# Patient Record
Sex: FEMALE | Race: WHITE | NOT HISPANIC OR LATINO | Employment: FULL TIME | URBAN - METROPOLITAN AREA
[De-identification: names, ages, dates, MRNs, and addresses within clinical notes are randomized per-mention and may not be internally consistent; named-entity substitution may affect disease eponyms.]

---

## 2017-03-23 ENCOUNTER — OFFICE VISIT (OUTPATIENT)
Dept: MIDWIFE SERVICES | Facility: CLINIC | Age: 27
End: 2017-03-23
Payer: COMMERCIAL

## 2017-03-23 VITALS
HEART RATE: 92 BPM | BODY MASS INDEX: 19.46 KG/M2 | DIASTOLIC BLOOD PRESSURE: 83 MMHG | HEIGHT: 68 IN | WEIGHT: 128.4 LBS | TEMPERATURE: 97 F | SYSTOLIC BLOOD PRESSURE: 123 MMHG

## 2017-03-23 DIAGNOSIS — Z11.3 SCREENING EXAMINATION FOR VENEREAL DISEASE: Primary | ICD-10-CM

## 2017-03-23 DIAGNOSIS — Z97.5 IUD (INTRAUTERINE DEVICE) IN PLACE: ICD-10-CM

## 2017-03-23 DIAGNOSIS — Z01.419 ENCOUNTER FOR WELL WOMAN EXAM: ICD-10-CM

## 2017-03-23 PROCEDURE — 87491 CHLMYD TRACH DNA AMP PROBE: CPT | Performed by: ADVANCED PRACTICE MIDWIFE

## 2017-03-23 PROCEDURE — 99395 PREV VISIT EST AGE 18-39: CPT | Performed by: ADVANCED PRACTICE MIDWIFE

## 2017-03-23 PROCEDURE — 87591 N.GONORRHOEAE DNA AMP PROB: CPT | Performed by: ADVANCED PRACTICE MIDWIFE

## 2017-03-23 NOTE — PROGRESS NOTES
America is a 27 year old  female who presents for annual exam.     Menses are rare and variable lasting 0 days.  Menses flow: normal and spotty.  No LMP recorded. Patient is not currently having periods (Reason: IUD).. Using IUD for contraception.  She is not currently considering pregnancy.  Besides routine health maintenance,  she would like to discuss  IUD strings.  GYNECOLOGIC HISTORY:  Menarche: 14  America is sexually active with 1 male partner(s) and is currently in monogamous relationship with  boyfriend.    History sexually transmitted infections:No STD history  STI testing offered?  Accepted  KENNETH exposure: Unknown  History of abnormal Pap smear: NO - age 21-29 PAP every 3 years recommended  Family history of breast CA: Yes (Please explain): M aunt  Family history of uterine/ovarian CA: No    Family history of colon CA: No    HEALTH MAINTENANCE:  Cholesterol: (  Cholesterol   Date Value Ref Range Status   2015 184 <200 mg/dL Final     Comment:     LDL Cholesterol is the primary guide to therapy.   The NCEP recommends further evaluation of: patients with cholesterol greater   than 200 mg/dL if additional risk factors are present, cholesterol greater   than   240 mg/dL, triglycerides greater than 150 mg/dL, or HDL less than 40 mg/dL.      History of abnormal lipids: No    Mammo: no . History of abnormal Mammo: Not applicable.  Regular Self Breast Exams: Yes    Current or Past (Physical, Sexual or Emotional):  No  Do you feel safe in your environment:  Yes    Calcium/Vitamin D intake: source:  dairy Adequate? Yes   Last TDAP?  Offered today? No    TSH: (  TSH   Date Value Ref Range Status   07/15/2013 0.56 0.4 - 5.0 mU/L Final    )  Pap; (  Lab Results   Component Value Date    PAP NIL 2015    PAP NIL 2012    )    HISTORY:  Obstetric History       T0      TAB0   SAB0   E0   M0   L0         Past Medical History:   Diagnosis Date     NO ACTIVE PROBLEMS      Past  "Surgical History:   Procedure Laterality Date     ORTHOPEDIC SURGERY       Family History   Problem Relation Age of Onset     C.A.D. Father      Alzheimer Disease Maternal Grandmother      CANCER Paternal Grandmother      Social History     Social History     Marital status: Single     Spouse name: N/A     Number of children: N/A     Years of education: N/A     Social History Main Topics     Smoking status: Never Smoker     Smokeless tobacco: Never Used     Alcohol use Yes      Comment: 7-10 per week     Drug use: No     Sexual activity: Yes     Partners: Male     Birth control/ protection: Condom     Other Topics Concern     Parent/Sibling W/ Cabg, Mi Or Angioplasty Before 65f 55m? Yes     Social History Narrative       Current Outpatient Prescriptions:      LORazepam (ATIVAN) 0.5 MG tablet, Take 1 tablet by mouth every 8 hours as needed for anxiety., Disp: 30 tablet, Rfl: 0     levonorgestrel (MIRENA) 20 MCG/24HR IUD, 1 each by Intrauterine route once., Disp: , Rfl:    No Known Allergies    Past medical, surgical, social and family history were reviewed and updated in EPIC.    ROS:   C:     NEGATIVE for fever, chills, change in weight  I:       NEGATIVE for worrisome rashes, moles or lesions  E:     NEGATIVE for vision changes or irritation  E/M: NEGATIVE for ear, mouth and throat problems  R:     NEGATIVE for significant cough or SOB  CV:   NEGATIVE for chest pain, palpitations or peripheral edema  GI:     NEGATIVE for nausea, abdominal pain, heartburn, or change in bowel habits  :   NEGATIVE for frequency, dysuria, hematuria, vaginal discharge, or irregular bleeding  M:     NEGATIVE for significant arthralgias or myalgia  N:      NEGATIVE for weakness, dizziness or paresthesias  E:      NEGATIVE for temperature intolerance, skin/hair changes  P:      NEGATIVE for changes in mood or affect.    EXAM:  /83  Pulse 92  Temp 97  F (36.1  C) (Oral)  Ht 5' 8\" (1.727 m)  Wt 128 lb 6.4 oz (58.2 kg)  BMI 19.52 " kg/m2   BMI: Body mass index is 19.52 kg/(m^2).  Constitutional: healthy, alert and no distress  Head: Normocephalic. No masses, lesions, tenderness or abnormalities  Neck: Neck supple. Trachea midline. No adenopathy. Thyroid symmetric, normal size.   Cardiovascular: RRR.   Respiratory: Negative.   Breast: Breasts reveal mild symmetric fibrocystic densities, but there are no dominant, discrete, fixed or suspicious masses found.  Gastrointestinal: Abdomen soft, non-tender, non-distended. No masses, organomegaly.  :  Vulva:  No external lesions, normal female hair distribution, no inguinal adenopathy.    Urethra:  Midline, non-tender, well supported, no discharge  Vagina:  Moist, pink, no abnormal discharge, no lesions  Uterus:  Normal size, , non-tender, freely mobile  Ovaries:  No masses appreciated, non-tender, mobile  Rectal Exam: deferred  Musculoskeletal: extremities normal  Skin: no suspicious lesions or rashes  Psychiatric: Affect appropriate, cooperative,mentation appears normal.     COUNSELING:   Reviewed preventive health counseling, as reflected in patient instructions   reports that she has never smoked. She has never used smokeless tobacco.    Body mass index is 19.52 kg/(m^2).    FRAX Risk Assessment    ASSESSMENT:  27 year old female with satisfactory annual exam  (Z11.3) Screening examination for venereal disease  (primary encounter diagnosis)  Comment:   Plan: Chlamydia trachomatis PCR, Neisseria         gonorrhoeae PCR            (Z97.5) IUD (intrauterine device) in place  Comment:   Plan:     Plan:  Pap due next year, Discussed with patient.       Return to the clinic in one year for your next annual appointment or sooner with concerns.    Shakila López CNM

## 2017-03-23 NOTE — MR AVS SNAPSHOT
"              After Visit Summary   3/23/2017    America Wright    MRN: [id number]           Patient Information     Date Of Birth          1990        Visit Information        Provider Department      3/23/2017 2:00 PM Shakila López APRN CNM [facility]        Today's Diagnoses     Screening examination for venereal disease    -  1    IUD (intrauterine device) in place        Encounter for well woman exam           Follow-ups after your visit        Who to contact     If you have questions or need follow up information about today's clinic visit or your schedule please contact [facility] directly at [phone].  Normal or non-critical lab and imaging results will be communicated to you by NicePeopleAtWorkhart, letter or phone within 4 business days after the clinic has received the results. If you do not hear from us within 7 days, please contact the clinic through NicePeopleAtWorkhart or phone. If you have a critical or abnormal lab result, we will notify you by phone as soon as possible.  Submit refill requests through [employer] or call your pharmacy and they will forward the refill request to us. Please allow 3 business days for your refill to be completed.          Additional Information About Your Visit        MyChart Information     [employer] gives you secure access to your electronic health record. If you see a primary care provider, you can also send messages to your care team and make appointments. If you have questions, please call your primary care clinic.  If you do not have a primary care provider, please call [phone] and they will assist you.        Care EveryWhere ID     This is your Care EveryWhere ID. This could be used by other organizations to access your [place] medical records  [id number]        Your Vitals Were     Pulse Temperature Height BMI (Body Mass Index)          92 97  F (36.1  C) (Oral) 5' 8\" (1.727 m) 19.52 kg/m2         Blood Pressure from Last 3 " Encounters:   03/23/17 123/83   11/11/16 108/72   09/15/16 106/82    Weight from Last 3 Encounters:   03/23/17 128 lb 6.4 oz (58.2 kg)   11/11/16 129 lb 6.4 oz (58.7 kg)   09/15/16 129 lb 11.2 oz (58.8 kg)              We Performed the Following     Chlamydia trachomatis PCR     Neisseria gonorrhoeae PCR        Primary Care Provider Office Phone # Fax #    Gibran Jamie Davey [phone]       [facility] [address] [facility] [address]  [facility] [address]        Thank you!     Thank you for choosing [facility]  for your care. Our goal is always to provide you with excellent care. Hearing back from our patients is one way we can continue to improve our services. Please take a few minutes to complete the written survey that you may receive in the mail after your visit with us. Thank you!             Your Updated Medication List - Protect others around you: Learn how to safely use, store and throw away your medicines at www.disposemymeds.org.          This list is accurate as of: 3/23/17  2:49 PM.  Always use your most recent med list.                   Brand Name Dispense Instructions for use    LORazepam 0.5 MG tablet    ATIVAN    30 tablet    Take 1 tablet by mouth every 8 hours as needed for anxiety.       MIRENA (52 MG) 20 MCG/24HR IUD   Generic drug:  levonorgestrel      1 each by Intrauterine route once.

## 2017-03-23 NOTE — NURSING NOTE
"Chief Complaint   Patient presents with     Physical       Initial /83  Pulse 92  Temp 97  F (36.1  C) (Oral)  Ht 5' 8\" (1.727 m)  Wt 128 lb 6.4 oz (58.2 kg)  BMI 19.52 kg/m2 Estimated body mass index is 19.52 kg/(m^2) as calculated from the following:    Height as of this encounter: 5' 8\" (1.727 m).    Weight as of this encounter: 128 lb 6.4 oz (58.2 kg).  BP completed using cuff size: regular        The following HM Due: NONE      The following patient reported/Care Every where data was sent to:  P ABSTRACT QUALITY INITIATIVES [[id number]]       N/a      Consuelo Rodriguez MA               "

## 2017-03-24 LAB
C TRACH DNA SPEC QL NAA+PROBE: NORMAL
N GONORRHOEA DNA SPEC QL NAA+PROBE: NORMAL
SPECIMEN SOURCE: NORMAL
SPECIMEN SOURCE: NORMAL

## 2017-03-24 NOTE — PROGRESS NOTES
Dear America,    Your test results are attached below. Great news, all your lab results are normal/ negative for infections. If you have any questions, please contact me via Excelerat or you can call our office at [phone].    Shakila Kelley CNM, JOSEM

## 2017-11-29 ENCOUNTER — OFFICE VISIT (OUTPATIENT)
Dept: FAMILY MEDICINE | Facility: CLINIC | Age: 27
End: 2017-11-29
Payer: COMMERCIAL

## 2017-11-29 VITALS — TEMPERATURE: 97.7 F | DIASTOLIC BLOOD PRESSURE: 66 MMHG | SYSTOLIC BLOOD PRESSURE: 118 MMHG

## 2017-11-29 DIAGNOSIS — Z71.84 TRAVEL ADVICE ENCOUNTER: Primary | ICD-10-CM

## 2017-11-29 DIAGNOSIS — Z23 NEED FOR VACCINATION: ICD-10-CM

## 2017-11-29 PROCEDURE — 90686 IIV4 VACC NO PRSV 0.5 ML IM: CPT | Mod: GA | Performed by: NURSE PRACTITIONER

## 2017-11-29 PROCEDURE — 99402 PREV MED CNSL INDIV APPRX 30: CPT | Mod: 25 | Performed by: NURSE PRACTITIONER

## 2017-11-29 PROCEDURE — 90632 HEPA VACCINE ADULT IM: CPT | Mod: GA | Performed by: NURSE PRACTITIONER

## 2017-11-29 RX ORDER — AZITHROMYCIN 500 MG/1
500 TABLET, FILM COATED ORAL DAILY
Qty: 3 TABLET | Refills: 0 | Status: SHIPPED | OUTPATIENT
Start: 2017-11-29 | End: 2017-12-02

## 2017-11-29 RX ORDER — ATOVAQUONE AND PROGUANIL HYDROCHLORIDE 250; 100 MG/1; MG/1
1 TABLET, FILM COATED ORAL DAILY
Qty: 15 TABLET | Refills: 0 | Status: SHIPPED | OUTPATIENT
Start: 2017-11-29 | End: 2019-03-08

## 2017-11-29 NOTE — MR AVS SNAPSHOT
After Visit Summary   11/29/2017    America Wright    MRN: [id number]           Patient Information     Date Of Birth          1990        Visit Information        Provider Department      11/29/2017 10:45 AM Kelly Gómez APRN CNP [facility]        Today's Diagnoses     Travel advice encounter    -  1    Need for vaccination          Care Instructions    Today November 29, 2017 you received the    Flu Vaccine    Hepatitis A Vaccine -     These appointments can be made as a NURSE ONLY visit.    **It is very important for the vaccinations to be given on the scheduled day(s), this helps ensure you receive the full effectiveness of the vaccine.**    Please call [facility] with any questions [phone]    Thank you for visiting [facility]              Follow-ups after your visit        Who to contact     If you have questions or need follow up information about [facility]'s clinic visit or your schedule please contact [facility] directly at [phone].  Normal or non-critical lab and imaging results will be communicated to you by Preztohart, letter or phone within 4 business days after the clinic has received the results. If you do not hear from us within 7 days, please contact the clinic through Preztohart or phone. If you have a critical or abnormal lab result, we will notify you by phone as soon as possible.  Submit refill requests through [employer] or call your pharmacy and they will forward the refill request to us. Please allow 3 business days for your refill to be completed.          Additional Information About Your Visit        MyChart Information     [employer] gives you secure access to your electronic health record. If you see a primary care provider, you can also send messages to your care team and make appointments. If you have questions, please call your primary care clinic.  If you do not have a primary care provider, please call  [phone] and they will assist you.        Care EveryWhere ID     This is your Care EveryWhere ID. This could be used by other organizations to access your [place] medical records  [id number]        Your Vitals Were     Temperature                   97.7  F (36.5  C) (Oral)            Blood Pressure from Last 3 Encounters:   11/29/17 118/66   03/23/17 123/83   11/11/16 108/72    Weight from Last 3 Encounters:   03/23/17 128 lb 6.4 oz (58.2 kg)   11/11/16 129 lb 6.4 oz (58.7 kg)   09/15/16 129 lb 11.2 oz (58.8 kg)              We Performed the Following     HC FLU VAC PRESRV FREE QUAD SPLIT VIR 3+YRS IM     HEPA VACCINE ADULT IM          Today's Medication Changes          These changes are accurate as of: 11/29/17 11:32 AM.  If you have any questions, ask your nurse or doctor.               Start taking these medicines.        Dose/Directions    atovaquone-proguanil 250-100 MG per tablet   Commonly known as:  MALARONE   Used for:  Need for vaccination, Travel advice encounter   Started by:  Kelly Gómez APRN CNP        Dose:  1 tablet   Take 1 tablet by mouth daily Start 2 days before exposure to Malaria and continue daily till  7 days after exposure.   Quantity:  15 tablet   Refills:  0       azithromycin 500 MG tablet   Commonly known as:  ZITHROMAX   Used for:  Travel advice encounter   Started by:  Kelly Gómez APRN CNP        Dose:  500 mg   Take 1 tablet (500 mg) by mouth daily for 3 doses Take 1 tablet a day for up to 3 days for severe diarrhea   Quantity:  3 tablet   Refills:  0            Where to get your medicines      These medications were sent to [id number] [address] [facility]  [address] [facility] [address]     Phone:  [phone]     atovaquone-proguanil 250-100 MG per tablet    azithromycin 500 MG tablet                Primary Care Provider Office Phone # Fax #    Gibran Davey [phone] [address]  [facility] [address]  [facility] [address]        Equal Access to Services     [facility] : Hadii maria antonia kelly madison Sanders, waaxda luqadaha, qaybta kaalmacherelle blevinsbelladayday villarrealcharlajose palumbo. So [facility] [phone].    ATENCIÓN: Si habla español, tiene a brown disposición servicios gratuitos de asistencia lingüística. [place] al [phone].    We comply with applicable federal civil rights laws and [place] laws. We do not discriminate on the basis of race, color, national origin, age, disability, sex, sexual orientation, or gender identity.            Thank you!     Thank you for choosing [facility]  for your care. Our goal is always to provide you with excellent care. Hearing back from our patients is one way we can continue to improve our services. Please take a few minutes to complete the written survey that you may receive in the mail after your visit with us. Thank you!             Your Updated Medication List - Protect others around you: Learn how to safely use, store and throw away your medicines at www.disposemymeds.org.          This list is accurate as of: 11/29/17 11:32 AM.  Always use your most recent med list.                   Brand Name Dispense Instructions for use Diagnosis    atovaquone-proguanil 250-100 MG per tablet    MALARONE    15 tablet    Take 1 tablet by mouth daily Start 2 days before exposure to Malaria and continue daily till  7 days after exposure.    Need for vaccination, Travel advice encounter       azithromycin 500 MG tablet    ZITHROMAX    3 tablet    Take 1 tablet (500 mg) by mouth daily for 3 doses Take 1 tablet a day for up to 3 days for severe diarrhea    Travel advice encounter       LORazepam 0.5 MG tablet    ATIVAN    30 tablet    Take 1 tablet by mouth every 8 hours as needed for anxiety.    Situational anxiety       MIRENA (52 MG) 20 MCG/24HR IUD   Generic drug:  levonorgestrel      1 each by Intrauterine route once.

## 2017-11-29 NOTE — PROGRESS NOTES
Nurse Note      Itinerary:  [facility]      Departure Date: 12/08/2017      Return Date: 12/24/2017      Length of Trip 16 days      Reason for Travel: Tourism           Urban or rural: both      Accommodations: Hotel    Hostel        IMMUNIZATION HISTORY  Have you received any immunizations within the past 4 weeks?  No  Have you ever fainted from having your blood drawn or from an injection?  Yes  Have you ever had a fever reaction to vaccination?  No  Have you ever had any bad reaction or side effect from any vaccination?  No  Have you ever had hepatitis A or B vaccine?  Yes  Do you live (or work closely) with anyone who has AIDS, an AIDS-like condition, any other immune disorder or who is on chemotherapy for cancer?  No  Do you have a family history of immunodeficiency?  No  Have you received any injection of immune globulin or any blood products during the past 12 months?  No    Patient roomed by CELESTINA Chan  America Wright is a 27 year old female seen today with partner for counsultation for international travel to [place] for [place]ism.  Patient will be departing in  10 day(s) and staying for   2 week(s) and  traveling with partner.      Patient itinerary :  will be in the [facility] region of  which presents risk for Dengue Fever, Chikungungya, Zika, Schistosomiasis, food borne illnesses, motor vehicle accidents and Typhoid. exposure.      Patient's activities will include sightseeing and safari/game [place]ynn and renting a car    Patient's country of birth is [place]    Special medical concerns: syncope with vaccines  Pre-travel questionnaire was completed by patient and reviewed by provider.     Vitals: /66  Temp 97.7  F (36.5  C) (Oral)  BMI= There is no height or weight on file to calculate BMI.    EXAM:  General:  Well-nourished, well-developed in no acute distress.  Appears to be stated age, interacts appropriately and expresses understanding of  information given to patient.    Current Outpatient Prescriptions   Medication Sig Dispense Refill     atovaquone-proguanil (MALARONE) 250-100 MG per tablet Take 1 tablet by mouth daily Start 2 days before exposure to Malaria and continue daily till  7 days after exposure. 15 tablet 0     azithromycin (ZITHROMAX) 500 MG tablet Take 1 tablet (500 mg) by mouth daily for 3 doses Take 1 tablet a day for up to 3 days for severe diarrhea 3 tablet 0     LORazepam (ATIVAN) 0.5 MG tablet Take 1 tablet by mouth every 8 hours as needed for anxiety. 30 tablet 0     levonorgestrel (MIRENA) 20 MCG/24HR IUD 1 each by Intrauterine route once.       Patient Active Problem List   Diagnosis     Surveillance of previously prescribed intrauterine contraceptive device     CARDIOVASCULAR SCREENING; LDL GOAL LESS THAN 160     Pelvic pain in female     Irritable bowel syndrome with diarrhea     Situational anxiety     IUD (intrauterine device) in place     No Known Allergies      Immunizations discussed include:   Hepatitis A:  Ordered/given today, risks, benefits and side effects reviewed  Hepatitis B: Up to date  Influenza: Ordered/given today, risks, benefits and side effects reviewed  Typhoid: Up to date  Rabies: Insufficient time to vaccinate  Yellow Fever: Not indicated  Japanese Encephalitis: Not indicated  Meningococcus: Not indicated  Tetanus/Diphtheria: Up to date  Measles/Mumps/Rubella: Up to date  Cholera: Not needed  Polio: Up to date  Pneumococcal: Under age of 65  Varicella: Immune by disease history per patient report  Zostavax:  Not indicated  HPV:  Not indicated  TB:  Low risk     Altitude Exposure on this trip: no  Past tolerance to Altitude: na    ASSESSMENT/PLAN:    ICD-10-CM    1. Travel advice encounter Z71.89 HEPA VACCINE ADULT IM     HC FLU VAC PRESRV FREE QUAD SPLIT VIR 3+YRS IM     atovaquone-proguanil (MALARONE) 250-100 MG per tablet     azithromycin (ZITHROMAX) 500 MG tablet   2. Need for vaccination Z23 HEPA  VACCINE ADULT IM     HC FLU VAC PRESRV FREE QUAD SPLIT VIR 3+YRS IM     atovaquone-proguanil (MALARONE) 250-100 MG per tablet     I have reviewed general recommendations for safe travel   including: food/water precautions, insect precautions, safer sex   practices given high prevalence of Zika, HIV and other STDs,   roadway safety. Educational materials and Travax report provided.    Malaraia prophylaxis recommended: Malarone  Symptomatic treatment for traveler's diarrhea: azithromycin  Altitude illness prevention and treatment: none      Evacuation insurance advised and resources were provided to patient.    Total visit time 30 minutes  with over 50% of time spent counseling patient as detailed above.    Kelly Gómez CNP

## 2017-11-29 NOTE — PATIENT INSTRUCTIONS
Today November 29, 2017 you received the    Flu Vaccine    Hepatitis A Vaccine -     These appointments can be made as a NURSE ONLY visit.    **It is very important for the vaccinations to be given on the scheduled day(s), this helps ensure you receive the full effectiveness of the vaccine.**    Please call [facility] with any questions [phone]    Thank you for visiting [facility]

## 2017-11-29 NOTE — NURSING NOTE
"Chief Complaint   Patient presents with     Travel Clinic     /66  Temp 97.7  F (36.5  C) (Oral) Estimated body mass index is 19.52 kg/(m^2) as calculated from the following:    Height as of 3/23/17: 5' 8\" (1.727 m).    Weight as of 3/23/17: 128 lb 6.4 oz (58.2 kg).  Medication Reconciliation: complete        Elena Bell CMA  "

## 2018-12-03 ENCOUNTER — HEALTH MAINTENANCE LETTER (OUTPATIENT)
Age: 28
End: 2018-12-03

## 2018-12-05 ENCOUNTER — OFFICE VISIT (OUTPATIENT)
Dept: FAMILY MEDICINE | Facility: CLINIC | Age: 28
End: 2018-12-05
Payer: COMMERCIAL

## 2018-12-05 VITALS
HEART RATE: 88 BPM | SYSTOLIC BLOOD PRESSURE: 107 MMHG | TEMPERATURE: 98 F | DIASTOLIC BLOOD PRESSURE: 74 MMHG | OXYGEN SATURATION: 97 % | RESPIRATION RATE: 16 BRPM

## 2018-12-05 DIAGNOSIS — Z71.84 TRAVEL ADVICE ENCOUNTER: Primary | ICD-10-CM

## 2018-12-05 DIAGNOSIS — Z23 NEED FOR VACCINATION: ICD-10-CM

## 2018-12-05 PROCEDURE — 90471 IMMUNIZATION ADMIN: CPT | Performed by: NURSE PRACTITIONER

## 2018-12-05 PROCEDURE — 90472 IMMUNIZATION ADMIN EACH ADD: CPT | Mod: GA | Performed by: NURSE PRACTITIONER

## 2018-12-05 PROCEDURE — 90691 TYPHOID VACCINE IM: CPT | Mod: GA | Performed by: NURSE PRACTITIONER

## 2018-12-05 PROCEDURE — 99402 PREV MED CNSL INDIV APPRX 30: CPT | Mod: 25 | Performed by: NURSE PRACTITIONER

## 2018-12-05 PROCEDURE — 90686 IIV4 VACC NO PRSV 0.5 ML IM: CPT | Performed by: NURSE PRACTITIONER

## 2018-12-05 RX ORDER — AZITHROMYCIN 500 MG/1
500 TABLET, FILM COATED ORAL DAILY
Qty: 3 TABLET | Refills: 0 | Status: SHIPPED | OUTPATIENT
Start: 2018-12-05 | End: 2019-03-08

## 2018-12-05 NOTE — NURSING NOTE
"Chief Complaint   Patient presents with     Travel Clinic     [facility]      /74  Pulse 88  Temp 98  F (36.7  C) (Oral)  Resp 16  SpO2 97% Estimated body mass index is 19.52 kg/(m^2) as calculated from the following:    Height as of 3/23/17: 5' 8\" (1.727 m).    Weight as of 3/23/17: 128 lb 6.4 oz (58.2 kg).  bp completed using cuff size: regular       Health Maintenance addressed:  NONE    n/a    Christina Feng MA     "

## 2018-12-05 NOTE — PROGRESS NOTES
Nurse Note      Itinerary:  [facility]       Departure Date: 12/07/2018      Return Date: 12/28/2018      Length of Trip 21 days       Reason for Travel: Tourism           Urban or rural: both      Accommodations: Hotel        IMMUNIZATION HISTORY  Have you received any immunizations within the past 4 weeks?  No  Have you ever fainted from having your blood drawn or from an injection?  Yes  Have you ever had a fever reaction to vaccination?  No  Have you ever had any bad reaction or side effect from any vaccination?  No  Have you ever had hepatitis A or B vaccine?  Yes  Do you live (or work closely) with anyone who has AIDS, an AIDS-like condition, any other immune disorder or who is on chemotherapy for cancer?  No  Do you have a family history of immunodeficiency?  No  Have you received any injection of immune globulin or any blood products during the past 12 months?  No    Patient roomed by SARA Doran  America Wright is a 28 year old female seen today with spouse for counsultation for international travel to [facility] for Tourism.  Patient will be departing in  2 day(s) and staying for   3 week(s) and  traveling with spouse.      Patient itinerary :  will be in the [facility] [place] [facility] and other region of [facility] which presents risk for [place], food borne illnesses and Typhoid. exposure.      Patient's activities will include sightseeing.    Patient[place] birth is [place]    Special medical concerns: none  Pre-travel questionnaire was completed by patient and reviewed by provider.     Vitals: /74  Pulse 88  Temp 98  F (36.7  C) (Oral)  Resp 16  SpO2 97%  BMI= There is no height or weight on file to calculate BMI.    EXAM:  General:  Well-nourished, well-developed in no acute distress.  Appears to be stated age, interacts appropriately and expresses understanding of information given to patient.    Current Outpatient Prescriptions   Medication Sig Dispense Refill      atovaquone-proguanil (MALARONE) 250-100 MG per tablet Take 1 tablet by mouth daily Start 2 days before exposure to Malaria and continue daily till  7 days after exposure. 15 tablet 0     levonorgestrel (MIRENA) 20 MCG/24HR IUD 1 each by Intrauterine route once.       LORazepam (ATIVAN) 0.5 MG tablet Take 1 tablet by mouth every 8 hours as needed for anxiety. 30 tablet 0     Patient Active Problem List   Diagnosis     Surveillance of previously prescribed intrauterine contraceptive device     CARDIOVASCULAR SCREENING; LDL GOAL LESS THAN 160     Pelvic pain in female     Irritable bowel syndrome with diarrhea     Situational anxiety     IUD (intrauterine device) in place     No Known Allergies      Immunizations discussed include:   Hepatitis A:  Up to date  Hepatitis B: Up to date  Influenza: Ordered/given today, risks, benefits and side effects reviewed  Typhoid: Ordered/given today, risks, benefits and side effects reviewed  Rabies: Insufficient time to vaccinate  Yellow Fever: Not indicated  Japanese Encephalitis: Not indicated  Meningococcus: Not indicated  Tetanus/Diphtheria: Up to date  Measles/Mumps/Rubella: Up to date  Cholera: Not needed  Polio: Up to date  Pneumococcal: not needed  Varicella: Immune by disease history per patient report  Zostavax:  Not indicated  Shingrix: Not indicated  HPV:  Up to date  TB:  Low risk     Altitude Exposure on this trip: no  Past tolerance to Altitude: na    ASSESSMENT/PLAN:    ICD-10-CM    1. Travel advice encounter Z71.89 TYPHOID VACCINE, IM     azithromycin (ZITHROMAX) 500 MG tablet     FLU VAC PRESRV FREE QUAD SPLIT VIR, IM (3+ YRS)     VACCINE ADMINISTRATION, INITIAL     VACCINE ADMINISTRATION, EACH ADDITIONAL   2. Need for vaccination Z23 VACCINE ADMINISTRATION, INITIAL     VACCINE ADMINISTRATION, EACH ADDITIONAL     I have reviewed general recommendations for safe travel   including: food/water precautions, insect precautions, safer sex   practices given high  prevalence of Zika, HIV and other STDs,   roadway safety. Educational materials and Travax report provided.    Malaraia prophylaxis recommended: none  Symptomatic treatment for traveler's diarrhea: azithromycin  Altitude illness prevention and treatment: na      Evacuation insurance advised and resources were provided to patient.    Total visit time 30 minutes  with over 50% of time spent counseling patient as detailed above.    Kelly Gómez CNP

## 2018-12-05 NOTE — PATIENT INSTRUCTIONS
Today December 5, 2018 you received the    Flu Vaccine    Typhoid - injectable. This vaccine is valid for two years.   .    These appointments can be made as a NURSE ONLY visit.    **It is very important for the vaccinations to be given on the scheduled day(s), this helps ensure you receive the full effectiveness of the vaccine.**    Please call [facility] with any questions [phone]    Thank you for visiting [facility]

## 2018-12-05 NOTE — MR AVS SNAPSHOT
After Visit Summary   12/5/2018    America Wright    MRN: [id number]           Patient Information     Date Of Birth          1990        Visit Information        Provider Department      12/5/2018 8:00 AM Kelly Gómez APRN CNP [facility]        Todays Diagnoses     Travel advice encounter    -  1    Need for vaccination          Care Instructions    Today December 5, 2018 you received the    Flu Vaccine    Typhoid - injectable. This vaccine is valid for two years.   .    These appointments can be made as a NURSE ONLY visit.    **It is very important for the vaccinations to be given on the scheduled day(s), this helps ensure you receive the full effectiveness of the vaccine.**    Please call [facility] with any questions [phone]    Thank you for visiting [facility]              Follow-ups after your visit        Who to contact     If you have questions or need follow up information about today's clinic visit or your schedule please contact [facility] directly at [phone].  Normal or non-critical lab and imaging results will be communicated to you by iMusicianhart, letter or phone within 4 business days after the clinic has received the results. If you do not hear from us within 7 days, please contact the clinic through iMusicianhart or phone. If you have a critical or abnormal lab result, we will notify you by phone as soon as possible.  Submit refill requests through OOgave or call your pharmacy and they will forward the refill request to us. Please allow 3 business days for your refill to be completed.          Additional Information About Your Visit        iMusicianhart Information     OOgave gives you secure access to your electronic health record. If you see a primary care provider, you can also send messages to your care team and make appointments. If you have questions, please call your primary care clinic.  If you do not have  a primary care provider, please call [phone] and they will assist you.        Care EveryWhere ID     This is your Care EveryWhere ID. This could be used by other organizations to access your [place] medical records  [id number]        Your Vitals Were     Pulse Temperature Respirations Pulse Oximetry          88 98  F (36.7  C) (Oral) 16 97%         Blood Pressure from Last 3 Encounters:   12/05/18 107/74   11/29/17 118/66   03/23/17 123/83    Weight from Last 3 Encounters:   03/23/17 128 lb 6.4 oz (58.2 kg)   11/11/16 129 lb 6.4 oz (58.7 kg)   09/15/16 129 lb 11.2 oz (58.8 kg)              We Performed the Following     FLU VAC PRESRV FREE QUAD SPLIT VIR, IM (3+ YRS)     TYPHOID VACCINE, IM          Today's Medication Changes          These changes are accurate as of 12/5/18  8:40 AM.  If you have any questions, ask your nurse or doctor.               Start taking these medicines.        Dose/Directions    azithromycin 500 MG tablet   Commonly known as:  ZITHROMAX   Used for:  Travel advice encounter   Started by:  Kelly Gómez, TAVO CNP        Dose:  500 mg   Take 1 tablet (500 mg) by mouth daily for 3 doses Take 1 tablet a day for up to 3 days for severe diarrhea   Quantity:  3 tablet   Refills:  0            Where to get your medicines      These medications were sent to [facility]/pharmacy #[id number] - [place], [place] - [address]  [address] [address]     Phone:  [phone]     azithromycin 500 MG tablet                Primary Care Provider Office Phone # Fax #    [facility] [phone]       [address] [address]        Equal Access to Services     [facility] : Hadii maria antonia Sanders, wakatrinada ludavieadaha, qaybta kaalmada jitendra, dayday palumbo. So [facility] [phone].    ATENCIÓN: Si habla español, tiene a brown disposición servicios gratuitos de asistencia lingüística. Llame al  [phone].    We comply with applicable federal civil rights laws and [place] laws. We do not discriminate on the basis of race, color, national origin, age, disability, sex, sexual orientation, or gender identity.            Thank you!     Thank you for choosing [facility]  for your care. Our goal is always to provide you with excellent care. Hearing back from our patients is one way we can continue to improve our services. Please take a few minutes to complete the written survey that you may receive in the mail after your visit with us. Thank you!             Your Updated Medication List - Protect others around you: Learn how to safely use, store and throw away your medicines at www.disposemymeds.org.          This list is accurate as of 12/5/18  8:40 AM.  Always use your most recent med list.                   Brand Name Dispense Instructions for use Diagnosis    atovaquone-proguanil 250-100 MG tablet    MALARONE    15 tablet    Take 1 tablet by mouth daily Start 2 days before exposure to Malaria and continue daily till  7 days after exposure.    Need for vaccination, Travel advice encounter       azithromycin 500 MG tablet    ZITHROMAX    3 tablet    Take 1 tablet (500 mg) by mouth daily for 3 doses Take 1 tablet a day for up to 3 days for severe diarrhea    Travel advice encounter       LORazepam 0.5 MG tablet    ATIVAN    30 tablet    Take 1 tablet by mouth every 8 hours as needed for anxiety.    Situational anxiety       MIRENA (52 MG) 20 MCG/24HR IUD   Generic drug:  levonorgestrel      1 each by Intrauterine route once.

## 2018-12-05 NOTE — NURSING NOTE
Screening Questionnaire for Adult Immunization    Are you sick today?   No   Do you have allergies to medications, food, a vaccine component or latex?   No   Have you ever had a serious reaction after receiving a vaccination?   No   Do you have a long-term health problem with heart disease, lung disease, asthma, kidney disease, metabolic disease (e.g. diabetes), anemia, or other blood disorder?   No   Do you have cancer, leukemia, HIV/AIDS, or any other immune system problem?   No   In the past 3 months, have you taken medications that affect  your immune system, such as prednisone, other steroids, or anticancer drugs; drugs for the treatment of rheumatoid arthritis, Crohn s disease, or psoriasis; or have you had radiation treatments?   No   Have you had a seizure, or a brain or other nervous system problem?   No   During the past year, have you received a transfusion of blood or blood     products, or been given immune (gamma) globulin or antiviral drug?   No   For women: Are you pregnant or is there a chance you could become        pregnant during the next month?   No   Have you received any vaccinations in the past 4 weeks?   No     Immunization questionnaire answers were all negative.      Prior to injection verified patient identity using patient's name and date of birth.  Due to injection administration, patient instructed to remain in clinic for 15 minutes  afterwards, and to report any adverse reaction to me immediately.      Per orders of FROILAN Gómez, injection of Typhoid and FLu given by Christina Feng. Patient instructed to remain in clinic for 15 minutes afterwards, and to report any adverse reaction to me immediately.       Screening performed by Christina Feng on 12/5/2018 at 9:00 AM.

## 2019-03-07 NOTE — PROGRESS NOTES
SUBJECTIVE:   CC: America Wright is an 29 year old woman who presents for preventive health visit.     Healthy Habits:    Do you get at least three servings of calcium containing foods daily (dairy, green leafy vegetables, etc.)? yes    Amount of exercise or daily activities, outside of work: 4 day(s) per week    Problems taking medications regularly No    Medication side effects: No    Have you had an eye exam in the past two years? no    Do you see a dentist twice per year? yes    Do you have sleep apnea, excessive snoring or daytime drowsiness?no    HPI: Patient reported lower back pain, which started one day ago after bending to  and object.  Pain is located at right lower back, pain is described as burning sensation. Pain is aggravated brushing  Hair and lifting objects. Pain is rated at 4/10. No history of prior occurence. Take ibuprofen which helps to relieve pain. No fever, chills, bowel or bladder incontinence     Today's PHQ-2 Score:   PHQ-2 ( 1999 Pfizer) 3/8/2019 11/11/2016   Q1: Little interest or pleasure in doing things 0 0   Q2: Feeling down, depressed or hopeless 0 0   PHQ-2 Score 0 0       Abuse: Current or Past(Physical, Sexual or Emotional)- No  Do you feel safe in your environment? Yes    Social History     Tobacco Use     Smoking status: Never Smoker     Smokeless tobacco: Never Used   Substance Use Topics     Alcohol use: Yes     Comment: 7-10 per week     If you drink alcohol do you typically have >3 drinks per day or >7 drinks per week? No                     Reviewed orders with patient.  Reviewed health maintenance and updated orders accordingly - Yes  Labs reviewed in EPIC  BP Readings from Last 3 Encounters:   12/05/18 107/74   11/29/17 118/66   03/23/17 123/83    Wt Readings from Last 3 Encounters:   03/23/17 58.2 kg (128 lb 6.4 oz)   11/11/16 58.7 kg (129 lb 6.4 oz)   09/15/16 58.8 kg (129 lb 11.2 oz)                  Patient Active Problem List   Diagnosis     Surveillance of  previously prescribed intrauterine contraceptive device     CARDIOVASCULAR SCREENING; LDL GOAL LESS THAN 160     Pelvic pain in female     Irritable bowel syndrome with diarrhea     Situational anxiety     IUD (intrauterine device) in place     Past Surgical History:   Procedure Laterality Date     ORTHOPEDIC SURGERY         Social History     Tobacco Use     Smoking status: Never Smoker     Smokeless tobacco: Never Used   Substance Use Topics     Alcohol use: Yes     Drinks per session: 1 or 2     Comment: 7-10 per week     Family History   Problem Relation Age of Onset     C.A.D. Father      Alzheimer Disease Maternal Grandmother      Cancer Paternal Grandmother          Current Outpatient Medications   Medication Sig Dispense Refill     levonorgestrel (MIRENA) 20 MCG/24HR IUD 1 each by Intrauterine route once.       LORazepam (ATIVAN) 0.5 MG tablet Take 1 tablet by mouth every 8 hours as needed for anxiety. 30 tablet 0     levonorgestrel (MIRENA) 20 MCG/24HR IUD 1 each (20 mcg) by Intrauterine route once for 1 dose 1 each 0     No Known Allergies    Mammogram not appropriate for this patient based on age.    Pertinent mammograms are reviewed under the imaging tab.  History of abnormal Pap smear: NO - age 21-29 PAP every 3 years recommended  PAP / HPV 9/29/2015 7/6/2012   PAP NIL NIL     Reviewed and updated as needed this visit by clinical staff  Allergies         Reviewed and updated as needed this visit by Provider            ROS:  CONSTITUTIONAL: NEGATIVE for fever, chills, change in weight  INTEGUMENTARY/SKIN: NEGATIVE for worrisome rashes, moles or lesions  EYES: NEGATIVE for vision changes or irritation  ENT: NEGATIVE for ear, mouth and throat problems  RESP: NEGATIVE for significant cough or SOB  BREAST: NEGATIVE for masses, tenderness or discharge  CV: NEGATIVE for chest pain, palpitations or peripheral edema  GI: NEGATIVE for nausea, abdominal pain, heartburn, or change in bowel habits  : NEGATIVE for  unusual urinary or vaginal symptoms. No vaginal bleeding.  MUSCULOSKELETAL: NEGATIVE for significant arthralgias or myalgia  NEURO: NEGATIVE for weakness, dizziness or paresthesias  PSYCHIATRIC: NEGATIVE for changes in mood or affect     OBJECTIVE:   There were no vitals taken for this visit.  EXAM:  GENERAL: healthy, alert and no distress  EYES: Eyes grossly normal to inspection, PERRL and conjunctivae and sclerae normal  HENT: ear canals and TM's normal, nose and mouth without ulcers or lesions  NECK: no adenopathy, no asymmetry, masses, or scars and thyroid normal to palpation  RESP: lungs clear to auscultation - no rales, rhonchi or wheezes  BREAST: normal without masses, tenderness or nipple discharge and no palpable axillary masses or adenopathy  CV: regular rate and rhythm, normal S1 S2, no S3 or S4, no murmur, click or rub, no peripheral edema and peripheral pulses strong  ABDOMEN: soft, nontender, no hepatosplenomegaly, no masses and bowel sounds normal  MS: no gross musculoskeletal defects noted, no edema  SKIN: no suspicious lesions or rashes  NEURO: Normal strength and tone, mentation intact and speech normal  PSYCH: mentation appears normal, affect normal/bright    ASSESSMENT/PLAN:       ICD-10-CM    1. Routine general medical examination at a health care facility Z00.00    2. Screening for malignant neoplasm of cervix Z12.4 Pap imaged thin layer screen reflex to HPV if ASCUS - recommend age 25 - 29   3. Back strain, initial encounter S39.012A OFFICE/OUTPT VISIT,EST,LEVL III      1. Routine general medical examination at a health care facility  Acceptable exam in good health    2. Screening for malignant neoplasm of cervix  - Pap imaged thin layer screen reflex to HPV if ASCUS - recommend age 25 - 29    3. Back strain, initial encounter  Comment: No red flag symptoms for cauda equina these include sharp or stabbing pain in legs or lower extremities bladder and bowel incontinent. base on symptoms most  "likely back strain Vs Sciatica.   Plan:Take Ibuprofen 600 - 800mg 2- three times a day for the next 5-7 days and  ice as needed throughout the day and can alternate with heat and gentle stretching exercises, alternate rest with activity, good posture and body mechanics.       COUNSELING:   Reviewed preventive health counseling, as reflected in patient instructions    BP Readings from Last 1 Encounters:   12/05/18 107/74     Estimated body mass index is 19.52 kg/m  as calculated from the following:    Height as of 3/23/17: 1.727 m (5' 8\").    Weight as of 3/23/17: 58.2 kg (128 lb 6.4 oz).           reports that  has never smoked. she has never used smokeless tobacco.      Counseling Resources:  ATP IV Guidelines  Pooled Cohorts Equation Calculator  Breast Cancer Risk Calculator  FRAX Risk Assessment  ICSI Preventive Guidelines  Dietary Guidelines for Americans, 2010  USDA's MyPlate  ASA Prophylaxis  Lung CA Screening    In addition to the physical, 15 minutes were spent face to face with America Wright of which more than 50% was spent on back injury/strain     TAVO Noriega [facility]  "

## 2019-03-08 ENCOUNTER — OFFICE VISIT (OUTPATIENT)
Dept: FAMILY MEDICINE | Facility: CLINIC | Age: 29
End: 2019-03-08
Payer: COMMERCIAL

## 2019-03-08 VITALS
BODY MASS INDEX: 19.77 KG/M2 | TEMPERATURE: 98 F | DIASTOLIC BLOOD PRESSURE: 76 MMHG | OXYGEN SATURATION: 99 % | WEIGHT: 130 LBS | HEART RATE: 98 BPM | RESPIRATION RATE: 16 BRPM | SYSTOLIC BLOOD PRESSURE: 102 MMHG

## 2019-03-08 DIAGNOSIS — Z12.4 SCREENING FOR MALIGNANT NEOPLASM OF CERVIX: ICD-10-CM

## 2019-03-08 DIAGNOSIS — Z00.00 ROUTINE GENERAL MEDICAL EXAMINATION AT A HEALTH CARE FACILITY: Primary | ICD-10-CM

## 2019-03-08 DIAGNOSIS — S39.012A BACK STRAIN, INITIAL ENCOUNTER: ICD-10-CM

## 2019-03-08 PROCEDURE — 99213 OFFICE O/P EST LOW 20 MIN: CPT | Mod: 25 | Performed by: NURSE PRACTITIONER

## 2019-03-08 PROCEDURE — G0145 SCR C/V CYTO,THINLAYER,RESCR: HCPCS | Performed by: NURSE PRACTITIONER

## 2019-03-08 PROCEDURE — 99395 PREV VISIT EST AGE 18-39: CPT | Performed by: NURSE PRACTITIONER

## 2019-03-08 SDOH — HEALTH STABILITY: MENTAL HEALTH: HOW MANY STANDARD DRINKS CONTAINING ALCOHOL DO YOU HAVE ON A TYPICAL DAY?: 1 OR 2

## 2019-03-08 NOTE — LETTER
March 14, 2019      America Wright  [address]   [place] [address]    Dear ,      I am happy to inform you that your recent cervical cancer screening test (PAP smear) was normal.      Preventative screenings such as this help to ensure your health for years to come. You should repeat a pap smear in 3 years, unless otherwise directed.      You will still need to return to the clinic every year for your annual exam and other preventive tests.     If you have additional questions regarding this result, please call our registered nurse, Uzma at [phone].      Sincerely,      TAVO Noriega CNP/esh

## 2019-03-08 NOTE — PATIENT INSTRUCTIONS
Preventive Health Recommendations  Female Ages 26 - 39  Yearly exam:   See your health care provider every year in order to    Review health changes.     Discuss preventive care.      Review your medicines if you your doctor has prescribed any.    Until age 30: Get a Pap test every three years (more often if you have had an abnormal result).    After age 30: Talk to your doctor about whether you should have a Pap test every 3 years or have a Pap test with HPV screening every 5 years.   You do not need a Pap test if your uterus was removed (hysterectomy) and you have not had cancer.  You should be tested each year for STDs (sexually transmitted diseases), if you're at risk.   Talk to your provider about how often to have your cholesterol checked.  If you are at risk for diabetes, you should have a diabetes test (fasting glucose).  Shots: Get a flu shot each year. Get a tetanus shot every 10 years.   Nutrition:     Eat at least 5 servings of fruits and vegetables each day.    Eat whole-grain bread, whole-wheat pasta and brown rice instead of white grains and rice.    Get adequate Calcium and Vitamin D.     Lifestyle    Exercise at least 150 minutes a week (30 minutes a day, 5 days of the week). This will help you control your weight and prevent disease.    Limit alcohol to one drink per day.    No smoking.     Wear sunscreen to prevent skin cancer.    See your dentist every six months for an exam and cleaning.    For Lower back pain   Take Ibuprofen 600 - 800mg 2- three times a day for the next 5-7 days and  ice as needed throughout the day and can alternate with heat and gentle stretching exercises, alternate rest with activity, good posture and body mechanics.

## 2019-03-13 LAB
COPATH REPORT: NORMAL
PAP: NORMAL

## 2019-11-08 ENCOUNTER — HEALTH MAINTENANCE LETTER (OUTPATIENT)
Age: 29
End: 2019-11-08

## 2020-11-20 ENCOUNTER — OFFICE VISIT (OUTPATIENT)
Dept: MIDWIFE SERVICES | Facility: CLINIC | Age: 30
End: 2020-11-20
Payer: COMMERCIAL

## 2020-11-20 VITALS
WEIGHT: 144 LBS | BODY MASS INDEX: 21.82 KG/M2 | SYSTOLIC BLOOD PRESSURE: 126 MMHG | DIASTOLIC BLOOD PRESSURE: 77 MMHG | HEART RATE: 85 BPM | HEIGHT: 68 IN

## 2020-11-20 DIAGNOSIS — Z30.432 ENCOUNTER FOR IUD REMOVAL: ICD-10-CM

## 2020-11-20 DIAGNOSIS — Z01.419 WELL WOMAN EXAM WITH ROUTINE GYNECOLOGICAL EXAM: Primary | ICD-10-CM

## 2020-11-20 DIAGNOSIS — Z97.5 IUD (INTRAUTERINE DEVICE) IN PLACE: ICD-10-CM

## 2020-11-20 DIAGNOSIS — Z30.430 ENCOUNTER FOR IUD INSERTION: ICD-10-CM

## 2020-11-20 PROCEDURE — 99385 PREV VISIT NEW AGE 18-39: CPT | Mod: 25 | Performed by: ADVANCED PRACTICE MIDWIFE

## 2020-11-20 PROCEDURE — 58300 INSERT INTRAUTERINE DEVICE: CPT | Performed by: ADVANCED PRACTICE MIDWIFE

## 2020-11-20 PROCEDURE — 58301 REMOVE INTRAUTERINE DEVICE: CPT | Performed by: ADVANCED PRACTICE MIDWIFE

## 2020-11-20 ASSESSMENT — MIFFLIN-ST. JEOR: SCORE: 1426.68

## 2020-11-20 NOTE — PATIENT INSTRUCTIONS
Upt-neg   Sterile spec   Betadaine prep   Lido in ant cervix   Tenaculum placed   Sounded to 10 cm   Mirena inserted   String clipped   Observed for 30 min

## 2020-11-20 NOTE — PROGRESS NOTES
Mirena - NDC: [id number], EXP: 2023, LOT: [id number]    America is a 30 year old  female who presents for annual exam.     She is currently not having a period due to the progesterone effect of her mirena IUD  Besides routine health maintenance,  she would like to have her mirena IUD removed and replaced.  GYNECOLOGIC HISTORY:  STI testing offered?  Declined  KENNETH exposure: Unknown  History of abnormal Pap smear: NO - age 21-29 PAP every 3 years recommended  Family history of breast CA: Yes (Please explain): Maternal Aunt at age 50  Family history of uterine/ovarian CA: No    Family history of colon CA: No    HEALTH MAINTENANCE:  Cholesterol: (  Cholesterol   Date Value Ref Range Status   2015 184 <200 mg/dL Final     Comment:     LDL Cholesterol is the primary guide to therapy.   The NCEP recommends further evaluation of: patients with cholesterol greater   than 200 mg/dL if additional risk factors are present, cholesterol greater   than   240 mg/dL, triglycerides greater than 150 mg/dL, or HDL less than 40 mg/dL.      History of abnormal lipids: No  Mammo: NA . History of abnormal Mammo: Not applicable.  Regular Self Breast Exams: No  Calcium/Vitamin D intake: source:  dairy Adequate? Yes  TSH: (  TSH   Date Value Ref Range Status   07/15/2013 0.56 0.4 - 5.0 mU/L Final    )  Pap; (  Lab Results   Component Value Date    PAP NIL 2019    PAP NIL 2015    PAP NIL 2012    )    HISTORY:  OB History    Para Term  AB Living   0 0 0 0 0 0   SAB TAB Ectopic Multiple Live Births   0 0 0 0 0     Past Medical History:   Diagnosis Date     NO ACTIVE PROBLEMS      Past Surgical History:   Procedure Laterality Date     ORTHOPEDIC SURGERY       Family History   Problem Relation Age of Onset     C.A.D. Father      Alzheimer Disease Maternal Grandmother      Cancer Paternal Grandmother      Social History     Socioeconomic History     Marital status: Single     Spouse name: Not on file      Number of children: Not on file     Years of education: Not on file     Highest education level: Not on file   Occupational History     Not on file   Social Needs     Financial resource strain: Not on file     Food insecurity     Worry: Not on file     Inability: Not on file     Transportation needs     Medical: Not on file     Non-medical: Not on file   Tobacco Use     Smoking status: Never Smoker     Smokeless tobacco: Never Used   Substance and Sexual Activity     Alcohol use: Yes     Drinks per session: 1 or 2     Comment: 7-10 per week     Drug use: No     Sexual activity: Yes     Partners: Male     Birth control/protection: I.U.D.   Lifestyle     Physical activity     Days per week: Not on file     Minutes per session: Not on file     Stress: Not on file   Relationships     Social connections     Talks on phone: Not on file     Gets together: Not on file     Attends Congregational service: Not on file     Active member of club or organization: Not on file     Attends meetings of clubs or organizations: Not on file     Relationship status: Not on file     Intimate partner violence     Fear of current or ex partner: Not on file     Emotionally abused: Not on file     Physically abused: Not on file     Forced sexual activity: Not on file   Other Topics Concern     Parent/sibling w/ CABG, MI or angioplasty before 65F 55M? Yes   Social History Narrative     Not on file       Current Outpatient Medications:      levonorgestrel (MIRENA) 20 MCG/24HR IUD, 1 each by Intrauterine route once., Disp: , Rfl:      LORazepam (ATIVAN) 0.5 MG tablet, Take 1 tablet by mouth every 8 hours as needed for anxiety., Disp: 30 tablet, Rfl: 0   No Known Allergies    Past medical, surgical, social and family history were reviewed and updated in EPIC.    ROS:   C:     NEGATIVE for fever, chills, change in weight  I:       NEGATIVE for worrisome rashes, moles or lesions  E:     NEGATIVE for vision changes or irritation  E/M: NEGATIVE for  ear, mouth and throat problems  R:     NEGATIVE for significant cough or SOB  CV:   NEGATIVE for chest pain, palpitations or peripheral edema  GI:     NEGATIVE for nausea, abdominal pain, heartburn, or change in bowel habits  :   NEGATIVE for frequency, dysuria, hematuria, vaginal discharge, or irregular bleeding  M:     NEGATIVE for significant arthralgias or myalgia  N:      NEGATIVE for weakness, dizziness or paresthesias  E:      NEGATIVE for temperature intolerance, skin/hair changes  P:      NEGATIVE for changes in mood or affect.    EXAM:  There were no vitals taken for this visit.   BMI: There is no height or weight on file to calculate BMI.  Constitutional: healthy, alert and no distress  Head: Normocephalic. No masses, lesions, tenderness or abnormalities  Neck: Neck supple. Trachea midline. No adenopathy. Thyroid symmetric, normal size.   Cardiovascular: RRR.   Respiratory: Negative.   Breast: No nodularity, asymmetry or nipple discharge bilaterally.  Gastrointestinal: Abdomen soft, non-tender, non-distended. No masses, organomegaly.  :  Vulva:  No external lesions, normal female hair distribution, no inguinal adenopathy.    Urethra:  Midline, non-tender, well supported, no discharge  Vagina:  Moist, pink, no abnormal discharge, no lesions  Uterus:  Normal size,  , non-tender, freely mobile  Ovaries:  No masses appreciated, non-tender, mobile  Rectal Exam: deferred  Musculoskeletal: extremities normal  Skin: no suspicious lesions or rashes  Psychiatric: Affect appropriate, cooperative,mentation appears normal.     COUNSELING:   Reviewed preventive health counseling, as reflected in patient instructions       Regular exercise       Healthy diet/nutrition       Contraception   reports that she has never smoked. She has never used smokeless tobacco.    There is no height or weight on file to calculate BMI.    FRAX Risk Assessment    ASSESSMENT:  30 year old female with satisfactory annual exam    Plan:    Return to the clinic in one year for your next annual appointment or sooner with concerns.      America Pastor, who is a 30 year old female with an obstetric hstory of      IUD Procedure notes    Risks and bens were reviewed including risk of infection, perforation of uterine muscle, IUD expulsion, and minimal risk of pregnancy.  Pt hx was reviewed and pt is appropriate candidate.      If choosing Mirena IUD, specific risks include irregular menses, amenorrhea and/or spotting especially in the next 3-6 months.    Pt was educated in checking for strings once a month and s/s of infection.    Verbal consent was obtained from the patient.    Removal procedure:Sterile speculum placed, cervical os cleansed with beta dine. IUD strings were visualized extending from cervix. They were grasped with ringed forceps. IUD was removed using gentle traction. It was inspected and appeared intact. Shown to the patient.      Procedure:  Tenaculum placed on anterior  cervix, uterus easily sounded to 7 cm.  The Mirena IUD placed without difficulty, strings trimmed to approx 1 inch outside of cervical os.    Lot # [id number]  expiration date 2023    Pt tolerated procedure well with minimal cramping.  D/C home with instruction letter.  Call with questions or concerns.          Shakila López CNM

## 2022-05-07 ENCOUNTER — HEALTH MAINTENANCE LETTER (OUTPATIENT)
Age: 32
End: 2022-05-07

## 2022-05-31 NOTE — PROGRESS NOTES
SUBJECTIVE:   CC: America Pastor is an 32 year old woman who presents for preventive health visit.         Healthy Habits:     Getting at least 3 servings of Calcium per day:  Yes    Bi-annual eye exam:  NO    Dental care twice a year:  Yes    Sleep apnea or symptoms of sleep apnea:  None    Diet:  Regular (no restrictions)    Frequency of exercise:  6-7 days/week    Duration of exercise:  15-30 minutes    Taking medications regularly:  Yes    Medication side effects:  Not applicable    PHQ-2 Total Score: 0    Additional concerns today:  Yes    Allergies usually in the fall but bad this spring, did couple covid tests were negative. 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 Desires pregnancy within the next few months or year, will take pnv    In good health      Today's PHQ-2 Score:   PHQ-2 ( 1999 Pfizer) 3/8/2019   Q1: Little interest or pleasure in doing things 0   Q2: Feeling down, depressed or hopeless 0   PHQ-2 Score 0   PHQ-2 Total Score (12-17 Years)- Positive if 3 or more points; Administer PHQ-A if positive 0       Abuse: Current or Past (Physical, Sexual or Emotional) - No  Do you feel safe in your environment? Yes      Social History     Tobacco Use     Smoking status: Never Smoker     Smokeless tobacco: Never Used   Substance Use Topics     Alcohol use: Yes     Comment: 7-10 per week     If you drink alcohol do you typically have >3 drinks per day or >7 drinks per week? Yes    Discussed, not a concern  No flowsheet data found.    Reviewed orders with patient.  Reviewed health maintenance and updated orders accordingly - Yes  Labs reviewed in EPIC  BP Readings from Last 3 Encounters:   06/01/22  112/78   11/20/20 126/77   03/08/19 102/76    Wt Readings from Last 3 Encounters:   06/01/22 67.1 kg (148 lb)   11/20/20 65.3 kg (144 lb)   03/08/19 59 kg (130 lb)                  Patient Active Problem List   Diagnosis     Surveillance of previously prescribed intrauterine contraceptive device     CARDIOVASCULAR SCREENING; LDL GOAL LESS THAN 160     Pelvic pain in female     Irritable bowel syndrome with diarrhea     Situational anxiety     IUD (intrauterine device) in place     Past Surgical History:   Procedure Laterality Date     ORTHOPEDIC SURGERY         Social History     Tobacco Use     Smoking status: Never Smoker     Smokeless tobacco: Never Used   Substance Use Topics     Alcohol use: Yes     Comment: 7-10 per week     Family History   Problem Relation Age of Onset     C.A.D. Father      Alzheimer Disease Maternal Grandmother      Cancer Maternal Aunt      Cancer Maternal Aunt          Current Outpatient Medications   Medication Sig Dispense Refill     levocetirizine (XYZAL) 5 MG tablet Take 1 tablet (5 mg) by mouth every evening 90 tablet 1     levonorgestrel (MIRENA) 20 MCG/24HR IUD 1 each by Intrauterine route once.       LORazepam (ATIVAN) 0.5 MG tablet Take 1 tablet by mouth every 8 hours as needed for anxiety. 30 tablet 0     Allergies   Allergen Reactions     Seasonal Allergies Cough     Vomiting and headaches       Breast Cancer Screening:  Any new diagnosis of family breast, ovarian, or bowel cancer? No    FHS-7: No flowsheet data found.    Patient under 40 years of age: Routine Mammogram Screening not recommended.   Pertinent mammograms are reviewed under the imaging tab.    History of abnormal Pap smear: NO - age 30-65 PAP every 5 years with negative HPV co-testing recommended  PAP / HPV 3/8/2019 9/29/2015 7/6/2012   PAP (Historical) NIL NIL NIL     Reviewed and updated as needed this visit by clinical staff                    Reviewed and updated as needed this visit by Provider            "        Past Medical History:   Diagnosis Date     NO ACTIVE PROBLEMS       Past Surgical History:   Procedure Laterality Date     ORTHOPEDIC SURGERY       OB History    Para Term  AB Living   0 0 0 0 0 0   SAB IAB Ectopic Multiple Live Births   0 0 0 0 0       Review of Systems   Constitutional: Negative for chills and fever.   HENT: Negative for congestion, ear pain, hearing loss and sore throat.    Eyes: Negative for pain and visual disturbance.   Respiratory: Negative for cough and shortness of breath.    Cardiovascular: Positive for peripheral edema. Negative for chest pain and palpitations.   Gastrointestinal: Negative for abdominal pain, constipation, diarrhea, heartburn, hematochezia and nausea.   Genitourinary: Negative for dysuria, frequency, genital sores, hematuria and urgency.   Musculoskeletal: Positive for arthralgias. Negative for myalgias.   Skin: Negative for rash.   Neurological: Negative for dizziness, weakness, headaches and paresthesias.   Psychiatric/Behavioral: Negative for mood changes. The patient is not nervous/anxious.      Constitutional, eye, ENT, skin, breast, respiratory, cardiac, GI, , MSK, neuro, psych, and allergy are normal except as otherwise noted.       OBJECTIVE:   /78   Pulse 82   Temp 98.6  F (37  C) (Temporal)   Ht 1.727 m (5' 8\")   Wt 67.1 kg (148 lb)   SpO2 92%   BMI 22.50 kg/m    Physical Exam  GENERAL: healthy, alert and no distress  EYES: Eyes grossly normal to inspection, PERRL and conjunctivae and sclerae normal  HENT: ear canals and TM's normal, nose and mouth without ulcers or lesions  NECK: no adenopathy, no asymmetry, masses, or scars and thyroid normal to palpation  RESP: lungs clear to auscultation - no rales, rhonchi or wheezes  BREAST: normal without masses, tenderness or nipple discharge and no palpable axillary masses or adenopathy  CV: regular rate and rhythm, normal S1 S2, no S3 or S4, no murmur, click or rub, no peripheral " "edema and peripheral pulses strong  ABDOMEN: soft, nontender, no hepatosplenomegaly, no masses and bowel sounds normal   (female): normal female external genitalia, normal urethral meatus, vaginal mucosa pink, moist, well rugated, and normal cervix/adnexa/uterus without masses or discharge, pap collected and IUD removed after pt consented and entire IUD visualized, pt tolerated well  MS: no gross musculoskeletal defects noted, no edema  SKIN: no suspicious lesions or rashes  NEURO: Normal strength and tone, mentation intact and speech normal  PSYCH: mentation appears normal, affect normal/bright    Diagnostic Test Results:  Labs reviewed in Epic    ASSESSMENT/PLAN:       ICD-10-CM    1. Routine general medical examination at a health care facility  Z00.00    2. Encounter for IUD removal  Z30.432    3. Screening for cervical cancer  Z12.4 PAP screen with HPV - recommended age 30 - 65 years     HPV Hold (Lab Only)     HPV High Risk Types DNA Cervical   4. Need for hepatitis C screening test  Z11.59    5. Seasonal allergic rhinitis due to pollen  J30.1 levocetirizine (XYZAL) 5 MG tablet     Alcohol discussed, cut down and if concerns will ask us for help      COUNSELING:  Reviewed preventive health counseling, as reflected in patient instructions    Estimated body mass index is 21.9 kg/m  as calculated from the following:    Height as of 11/20/20: 1.727 m (5' 8\").    Weight as of 11/20/20: 65.3 kg (144 lb).        She reports that she has never smoked. She has never used smokeless tobacco.      Counseling Resources:  ATP IV Guidelines  Pooled Cohorts Equation Calculator  Breast Cancer Risk Calculator  BRCA-Related Cancer Risk Assessment: FHS-7 Tool  FRAX Risk Assessment  ICSI Preventive Guidelines  Dietary Guidelines for Americans, 2010  USDA's MyPlate  ASA Prophylaxis  Lung CA Screening    Babita Zavala, TAVO CNP  [facility]  "

## 2022-05-31 NOTE — PATIENT INSTRUCTIONS
If your lymph node in your neck changes or gets bigger, let us know and would consider an ultrasound at that time. If you've had it for years and flares with sicknesses then likely not concerning. Get a yearly breast exam and let me know if any changes would ultrasound to be sure benign.     Preventive Health Recommendations  Female Ages 26 - 39  Yearly exam:   See your health care provider every year in order to  Review health changes.   Discuss preventive care.    Review your medicines if you your doctor has prescribed any.    Until age 30: Get a Pap test every three years (more often if you have had an abnormal result).    After age 30: Talk to your doctor about whether you should have a Pap test every 3 years or have a Pap test with HPV screening every 5 years.   You do not need a Pap test if your uterus was removed (hysterectomy) and you have not had cancer.  You should be tested each year for STDs (sexually transmitted diseases), if you're at risk.   Talk to your provider about how often to have your cholesterol checked.  If you are at risk for diabetes, you should have a diabetes test (fasting glucose).  Shots: Get a flu shot each year. Get a tetanus shot every 10 years.   Nutrition:   Eat at least 5 servings of fruits and vegetables each day.  Eat whole-grain bread, whole-wheat pasta and brown rice instead of white grains and rice.  Get adequate Calcium and Vitamin D.     Lifestyle  Exercise at least 150 minutes a week (30 minutes a day, 5 days of the week). This will help you control your weight and prevent disease.  Limit alcohol to one drink per day.  No smoking.   Wear sunscreen to prevent skin cancer.  See your dentist every six months for an exam and cleaning.

## 2022-06-01 ENCOUNTER — TELEPHONE (OUTPATIENT)
Dept: FAMILY MEDICINE | Facility: CLINIC | Age: 32
End: 2022-06-01

## 2022-06-01 ENCOUNTER — OFFICE VISIT (OUTPATIENT)
Dept: FAMILY MEDICINE | Facility: CLINIC | Age: 32
End: 2022-06-01
Payer: COMMERCIAL

## 2022-06-01 VITALS
WEIGHT: 148 LBS | TEMPERATURE: 98.6 F | HEIGHT: 68 IN | DIASTOLIC BLOOD PRESSURE: 78 MMHG | SYSTOLIC BLOOD PRESSURE: 112 MMHG | HEART RATE: 82 BPM | BODY MASS INDEX: 22.43 KG/M2 | OXYGEN SATURATION: 92 %

## 2022-06-01 DIAGNOSIS — J30.1 SEASONAL ALLERGIC RHINITIS DUE TO POLLEN: ICD-10-CM

## 2022-06-01 DIAGNOSIS — Z00.00 ROUTINE GENERAL MEDICAL EXAMINATION AT A HEALTH CARE FACILITY: Primary | ICD-10-CM

## 2022-06-01 DIAGNOSIS — Z30.432 ENCOUNTER FOR IUD REMOVAL: ICD-10-CM

## 2022-06-01 DIAGNOSIS — Z12.4 SCREENING FOR CERVICAL CANCER: ICD-10-CM

## 2022-06-01 DIAGNOSIS — Z11.59 NEED FOR HEPATITIS C SCREENING TEST: ICD-10-CM

## 2022-06-01 PROCEDURE — 99385 PREV VISIT NEW AGE 18-39: CPT | Performed by: NURSE PRACTITIONER

## 2022-06-01 PROCEDURE — G0145 SCR C/V CYTO,THINLAYER,RESCR: HCPCS | Performed by: NURSE PRACTITIONER

## 2022-06-01 PROCEDURE — 87624 HPV HI-RISK TYP POOLED RSLT: CPT | Performed by: NURSE PRACTITIONER

## 2022-06-01 RX ORDER — LEVOCETIRIZINE DIHYDROCHLORIDE 5 MG/1
5 TABLET, FILM COATED ORAL EVERY EVENING
Qty: 90 TABLET | Refills: 1 | Status: SHIPPED | OUTPATIENT
Start: 2022-06-01 | End: 2024-06-05

## 2022-06-01 ASSESSMENT — ENCOUNTER SYMPTOMS
CHILLS: 0
FEVER: 0
DYSURIA: 0
DIZZINESS: 0
WEAKNESS: 0
NERVOUS/ANXIOUS: 0
ABDOMINAL PAIN: 0
SHORTNESS OF BREATH: 0
ARTHRALGIAS: 1
HEARTBURN: 0
EYE PAIN: 0
COUGH: 0
FREQUENCY: 0
PARESTHESIAS: 0
HEMATOCHEZIA: 0
HEMATURIA: 0
CONSTIPATION: 0
PALPITATIONS: 0
NAUSEA: 0
MYALGIAS: 0
SORE THROAT: 0
DIARRHEA: 0
HEADACHES: 0

## 2022-06-06 LAB
BKR LAB AP GYN ADEQUACY: NORMAL
BKR LAB AP GYN INTERPRETATION: NORMAL
BKR LAB AP HPV REFLEX: NORMAL
BKR LAB AP LMP: NORMAL
BKR LAB AP PREVIOUS ABNORMAL: NORMAL
PATH REPORT.COMMENTS IMP SPEC: NORMAL
PATH REPORT.COMMENTS IMP SPEC: NORMAL
PATH REPORT.RELEVANT HX SPEC: NORMAL

## 2022-06-06 NOTE — TELEPHONE ENCOUNTER
Central Prior Authorization Team  Phone: [phone]    PA Initiation    Medication: levocetirizine (XYZAL) 5 MG tablet  Insurance Company: [employer] - Phone [phone] Fax [phone]  Pharmacy Filling the Rx: [id number] - [place], [place] - [address] AT [facility]  Filling Pharmacy Phone: [phone]  Filling Pharmacy Fax:    Start Date: 6/6/2022

## 2022-06-07 NOTE — TELEPHONE ENCOUNTER
PRIOR AUTHORIZATION DENIED    Medication: levocetirizine (XYZAL) 5 MG tablet- DENIED     Denial Date: 6/7/2022    Denial Rational: This medication is excluded from the pt's pharmacy benefit.      Appeal Information: If provider would like to appeal please provide a letter of medical necessity.

## 2022-06-08 LAB
HUMAN PAPILLOMA VIRUS 16 DNA: NEGATIVE
HUMAN PAPILLOMA VIRUS 18 DNA: NEGATIVE
HUMAN PAPILLOMA VIRUS FINAL DIAGNOSIS: NORMAL
HUMAN PAPILLOMA VIRUS OTHER HR: NEGATIVE

## 2023-04-22 ENCOUNTER — HEALTH MAINTENANCE LETTER (OUTPATIENT)
Age: 33
End: 2023-04-22

## 2023-07-15 ENCOUNTER — HEALTH MAINTENANCE LETTER (OUTPATIENT)
Age: 33
End: 2023-07-15

## 2023-12-19 ENCOUNTER — OFFICE VISIT (OUTPATIENT)
Dept: FAMILY MEDICINE | Facility: CLINIC | Age: 33
End: 2023-12-19
Payer: COMMERCIAL

## 2023-12-19 VITALS
OXYGEN SATURATION: 100 % | BODY MASS INDEX: 21.6 KG/M2 | TEMPERATURE: 98 F | WEIGHT: 142.5 LBS | SYSTOLIC BLOOD PRESSURE: 114 MMHG | HEIGHT: 68 IN | RESPIRATION RATE: 15 BRPM | DIASTOLIC BLOOD PRESSURE: 75 MMHG | HEART RATE: 77 BPM

## 2023-12-19 DIAGNOSIS — J30.2 SEASONAL ALLERGIC RHINITIS, UNSPECIFIED TRIGGER: ICD-10-CM

## 2023-12-19 DIAGNOSIS — Z00.00 ROUTINE HISTORY AND PHYSICAL EXAMINATION OF ADULT: Primary | ICD-10-CM

## 2023-12-19 PROCEDURE — 99213 OFFICE O/P EST LOW 20 MIN: CPT | Mod: 25 | Performed by: NURSE PRACTITIONER

## 2023-12-19 PROCEDURE — 99395 PREV VISIT EST AGE 18-39: CPT | Performed by: NURSE PRACTITIONER

## 2023-12-19 RX ORDER — FLUTICASONE PROPIONATE 50 MCG
1 SPRAY, SUSPENSION (ML) NASAL DAILY
Qty: 9.9 ML | Refills: 1 | Status: SHIPPED | OUTPATIENT
Start: 2023-12-19 | End: 2024-06-05

## 2023-12-19 ASSESSMENT — ENCOUNTER SYMPTOMS
HEMATOCHEZIA: 0
SHORTNESS OF BREATH: 1
HEMATURIA: 0
PARESTHESIAS: 0
CHILLS: 0
MYALGIAS: 0
DIZZINESS: 0
ARTHRALGIAS: 0
SORE THROAT: 1
JOINT SWELLING: 0
DYSURIA: 0
COUGH: 1
FEVER: 0
HEARTBURN: 0
EYE PAIN: 0
HEADACHES: 0
CONSTIPATION: 0
FREQUENCY: 0
NAUSEA: 0
BREAST MASS: 0
NERVOUS/ANXIOUS: 0
ABDOMINAL PAIN: 0
WEAKNESS: 0
PALPITATIONS: 0
DIARRHEA: 0

## 2023-12-19 ASSESSMENT — PAIN SCALES - GENERAL: PAINLEVEL: NO PAIN (0)

## 2023-12-19 NOTE — PROGRESS NOTES
SUBJECTIVE:   America is a 33 year old, presenting for the following:  Physical        12/19/2023     8:12 AM   Additional Questions   Roomed by Sunshine Lopez       Healthy Habits:     Getting at least 3 servings of Calcium per day:  Yes    Bi-annual eye exam:  Yes    Dental care twice a year:  Yes    Sleep apnea or symptoms of sleep apnea:  None    Diet:  Regular (no restrictions)    Frequency of exercise:  2-3 days/week    Duration of exercise:  45-60 minutes    Taking medications regularly:  Yes    Medication side effects:  None    Additional concerns today:  No    -has had issues of coughing in the morning; sometimes has to cough so much that she vomits. She typically gets allergies in the fall. Takes an allergy medication at night. She tolerates exercise fine. Feels like when she exercises in the morning symptoms resolve faster. Sometimes tickle in throat. Brings up a clear/yellow mucus when she coughs. No heartburn. Has not been ill this fall.   -planning to start trying for children in the next year. Has had regular periods; IUD out since June 2022.     Today's PHQ-2 Score:       12/19/2023     8:03 AM   PHQ-2 ( 1999 Pfizer)   Q1: Little interest or pleasure in doing things 0   Q2: Feeling down, depressed or hopeless 0   PHQ-2 Score 0   Q1: Little interest or pleasure in doing things Not at all   Q2: Feeling down, depressed or hopeless Not at all   PHQ-2 Score 0         Have you ever done Advance Care Planning? (For example, a Health Directive, POLST, or a discussion with a medical provider or your loved ones about your wishes): No, advance care planning information given to patient to review.  Patient plans to discuss their wishes with loved ones or provider.      Social History     Tobacco Use    Smoking status: Never    Smokeless tobacco: Never   Substance Use Topics    Alcohol use: Yes     Comment: 7-10 per week           12/19/2023     8:03 AM   Alcohol Use   Prescreen: >3 drinks/day or >7  drinks/week? No          No data to display              Reviewed orders with patient.  Reviewed health maintenance and updated orders accordingly -       Breast Cancer Screening:    FHS-7:       6/1/2022     2:25 PM 12/19/2023     8:05 AM   Breast CA Risk Assessment (FHS-7)   Did any of your first-degree relatives have breast or ovarian cancer? No No   Did any of your relatives have bilateral breast cancer? Yes No   Did any man in your family have breast cancer? No No   Did any woman in your family have breast and ovarian cancer? No Yes   Did any woman in your family have breast cancer before age 50 y? Unknown Yes   Do you have 2 or more relatives with breast and/or ovarian cancer? Yes Yes   Do you have 2 or more relatives with breast and/or bowel cancer? No Yes       Pertinent mammograms are reviewed under the imaging tab.    History of abnormal Pap smear: NO - age 30-65 PAP every 5 years with negative HPV co-testing recommended      Latest Ref Rng & Units 6/1/2022     3:05 PM 3/8/2019     8:42 AM 9/29/2015    12:00 AM   PAP / HPV   PAP  Negative for Intraepithelial Lesion or Malignancy (NILM)      PAP (Historical)   NIL  NIL    HPV 16 DNA Negative Negative      HPV 18 DNA Negative Negative      Other HR HPV Negative Negative        Reviewed and updated as needed this visit by clinical staff   Tobacco  Allergies  Meds              Reviewed and updated as needed this visit by Provider                     Review of Systems   Constitutional:  Negative for chills and fever.   HENT:  Positive for sore throat. Negative for congestion, ear pain and hearing loss.    Eyes:  Negative for pain and visual disturbance.   Respiratory:  Positive for cough and shortness of breath.    Cardiovascular:  Negative for chest pain, palpitations and peripheral edema.   Gastrointestinal:  Negative for abdominal pain, constipation, diarrhea, heartburn, hematochezia and nausea.   Breasts:  Negative for tenderness, breast mass and  "discharge.   Genitourinary:  Negative for dysuria, frequency, genital sores, hematuria, pelvic pain, urgency, vaginal bleeding and vaginal discharge.   Musculoskeletal:  Negative for arthralgias, joint swelling and myalgias.   Skin:  Negative for rash.   Neurological:  Negative for dizziness, weakness, headaches and paresthesias.   Psychiatric/Behavioral:  Negative for mood changes. The patient is not nervous/anxious.         OBJECTIVE:   /75 (BP Location: Left arm, Patient Position: Sitting, Cuff Size: Adult Regular)   Pulse 77   Temp 98  F (36.7  C) (Temporal)   Resp 15   Ht 1.737 m (5' 8.39\")   Wt 64.6 kg (142 lb 8 oz)   LMP 11/28/2023 (Within Days)   SpO2 100%   BMI 21.42 kg/m    Physical Exam  GENERAL: healthy, alert and no distress  HENT: normal cephalic/atraumatic, both ears: clear effusion and bulging membrane, nasal mucosa edematous , oropharynx clear, and oral mucous membranes moist  NECK: no adenopathy, no asymmetry, masses, or scars and thyroid normal to palpation  RESP: lungs clear to auscultation - no rales, rhonchi or wheezes  BREAST: normal without masses, tenderness or nipple discharge and no palpable axillary masses or adenopathy  CV: regular rate and rhythm, normal S1 S2, no S3 or S4, no murmur, click or rub, no peripheral edema and peripheral pulses strong  ABDOMEN: soft, nontender, no hepatosplenomegaly, no masses and bowel sounds normal  MS: no gross musculoskeletal defects noted, no edema  SKIN: no suspicious lesions or rashes  NEURO: Normal strength and tone, mentation intact and speech normal  PSYCH: mentation appears normal, affect normal/bright    Diagnostic Test Results:  Labs reviewed in Epic  No results found for this or any previous visit (from the past 24 hour(s)).    ASSESSMENT/PLAN:       ICD-10-CM    1. Routine history and physical examination of adult  Z00.00       2. Seasonal allergic rhinitis, unspecified trigger  J30.2 fluticasone (FLONASE) 50 MCG/ACT nasal spray "      Continue OTC allergy medications; add Flonase for the next month      Patient has been advised of split billing requirements and indicates understanding: Yes      COUNSELING:  Reviewed preventive health counseling, as reflected in patient instructions       Regular exercise       Healthy diet/nutrition       Family planning        She reports that she has never smoked. She has never used smokeless tobacco.        TAVO Fung CNP [facility]

## 2024-06-05 ENCOUNTER — VIRTUAL VISIT (OUTPATIENT)
Dept: OBGYN | Facility: CLINIC | Age: 34
End: 2024-06-05
Payer: COMMERCIAL

## 2024-06-05 ENCOUNTER — TRANSCRIBE ORDERS (OUTPATIENT)
Dept: MATERNAL FETAL MEDICINE | Facility: CLINIC | Age: 34
End: 2024-06-05

## 2024-06-05 VITALS — HEIGHT: 68 IN | BODY MASS INDEX: 21.51 KG/M2

## 2024-06-05 DIAGNOSIS — Z34.00 ENCOUNTER FOR SUPERVISION OF NORMAL FIRST PREGNANCY: Primary | ICD-10-CM

## 2024-06-05 DIAGNOSIS — O26.90 PREGNANCY RELATED CONDITION, ANTEPARTUM: Primary | ICD-10-CM

## 2024-06-05 PROBLEM — Z97.5 IUD (INTRAUTERINE DEVICE) IN PLACE: Status: RESOLVED | Noted: 2017-03-23 | Resolved: 2024-06-05

## 2024-06-05 PROCEDURE — 99207 PR NO CHARGE NURSE ONLY: CPT | Mod: 93

## 2024-06-05 NOTE — PROGRESS NOTES
Important Information for Provider:     New ob nurse intake by phone, first pregnancy. Handouts reviewed. Ordered genetic testing. Denies any problems at this time. Ultrasound and NOB with CNM 6/26/2024         Caffeine intake/servings daily - 2  Calcium intake/servings daily -   Exercise 5 times weekly - describe ; yoga, HIIT, gym, precautions given  Sunscreen used - Yes  Seatbelts used - Yes  Guns stored in the home - No  Self Breast Exam - Yes  Pap test up to date -  Yes  Dental exam up to date -  No  Immunizations reviewed and up to date - Yes  Abuse: Current or Past (Physical, Sexual or Emotional) - No  Do you feel safe in your environment - Yes  Do you cope well with stress - Yes        Prenatal OB Questionnaire  Patient supplied answers from flow sheet for:  Prenatal OB Questionnaire.  Past Medical History  Have you ever recieved care for your mental health? : (!) Yes  Have you ever been in a major accident or suffered serious trauma?: No  Within the last year, has anyone hit, slapped, kicked or otherwise hurt you?: No  In the last year, has anyone forced you to have sex when you didn't want to?: No    Past Medical History 2   Have you ever received a blood transfusion?: No  Would you accept a blood transfusion if was medically recommended?: Yes  Does anyone in your home smoke?: No   Is your blood type Rh negative?: Unknown  Have you ever ?: No  Have you been hospitalized for a nonsurgical reason excluding normal delivery?: No  Have you ever had an abnormal pap smear?: No    Past Medical History (Continued)  Do you have a history of abnormalities of the uterus?: No  Did your mother take KENNETH or any other hormones when she was pregnant with you?: Unknown  Do you have any other problems we have not asked about which you feel may be important to this pregnancy?: No                     Allergies as of 6/5/2024:    Allergies as of 06/05/2024 - Reviewed 06/05/2024   Allergen Reaction Noted    Seasonal  allergies Cough 06/01/2022               Early ultrasound screening tool:    Does patient have irregular periods?  No  Did patient use hormonal birth control in the three months prior to positive urine pregnancy test? No  Is the patient breastfeeding?  No  Is the patient 10 weeks or greater at time of education visit?  No

## 2024-06-30 LAB
ABO/RH(D): NORMAL
ANTIBODY SCREEN: NEGATIVE
SPECIMEN EXPIRATION DATE: NORMAL

## 2024-07-01 ENCOUNTER — PRENATAL OFFICE VISIT (OUTPATIENT)
Dept: MIDWIFE SERVICES | Facility: CLINIC | Age: 34
End: 2024-07-01
Attending: ADVANCED PRACTICE MIDWIFE
Payer: COMMERCIAL

## 2024-07-01 ENCOUNTER — ANCILLARY PROCEDURE (OUTPATIENT)
Dept: ULTRASOUND IMAGING | Facility: CLINIC | Age: 34
End: 2024-07-01
Attending: ADVANCED PRACTICE MIDWIFE
Payer: COMMERCIAL

## 2024-07-01 VITALS
WEIGHT: 143.1 LBS | OXYGEN SATURATION: 99 % | SYSTOLIC BLOOD PRESSURE: 116 MMHG | DIASTOLIC BLOOD PRESSURE: 71 MMHG | BODY MASS INDEX: 21.6 KG/M2 | HEART RATE: 81 BPM

## 2024-07-01 DIAGNOSIS — Z34.00 ENCOUNTER FOR SUPERVISION OF NORMAL FIRST PREGNANCY: ICD-10-CM

## 2024-07-01 DIAGNOSIS — N89.8 VAGINAL ITCHING: ICD-10-CM

## 2024-07-01 DIAGNOSIS — Z34.01 ENCOUNTER FOR SUPERVISION OF NORMAL FIRST PREGNANCY IN FIRST TRIMESTER: Primary | ICD-10-CM

## 2024-07-01 DIAGNOSIS — Z80.3 FAMILY HISTORY OF MALIGNANT NEOPLASM OF BREAST: ICD-10-CM

## 2024-07-01 LAB
ALBUMIN UR-MCNC: NEGATIVE MG/DL
APPEARANCE UR: CLEAR
BILIRUB UR QL STRIP: NEGATIVE
CLUE CELLS: ABNORMAL
COLOR UR AUTO: YELLOW
ERYTHROCYTE [DISTWIDTH] IN BLOOD BY AUTOMATED COUNT: 12.3 % (ref 10–15)
GLUCOSE UR STRIP-MCNC: NEGATIVE MG/DL
HBV SURFACE AG SERPL QL IA: NONREACTIVE
HCT VFR BLD AUTO: 39.4 % (ref 35–47)
HCV AB SERPL QL IA: NONREACTIVE
HGB BLD-MCNC: 13.2 G/DL (ref 11.7–15.7)
HGB UR QL STRIP: NEGATIVE
HIV 1+2 AB+HIV1 P24 AG SERPL QL IA: NONREACTIVE
KETONES UR STRIP-MCNC: ABNORMAL MG/DL
LEUKOCYTE ESTERASE UR QL STRIP: NEGATIVE
MCH RBC QN AUTO: 29.7 PG (ref 26.5–33)
MCHC RBC AUTO-ENTMCNC: 33.5 G/DL (ref 31.5–36.5)
MCV RBC AUTO: 89 FL (ref 78–100)
NITRATE UR QL: NEGATIVE
PH UR STRIP: 5.5 [PH] (ref 5–7)
PLATELET # BLD AUTO: 199 10E3/UL (ref 150–450)
RBC # BLD AUTO: 4.44 10E6/UL (ref 3.8–5.2)
RUBV IGG SERPL QL IA: 0.65 INDEX
RUBV IGG SERPL QL IA: NORMAL
SP GR UR STRIP: >=1.03 (ref 1–1.03)
T PALLIDUM AB SER QL: NONREACTIVE
TRICHOMONAS, WET PREP: ABNORMAL
UROBILINOGEN UR STRIP-ACNC: 0.2 E.U./DL
WBC # BLD AUTO: 8.6 10E3/UL (ref 4–11)
WBC'S/HIGH POWER FIELD, WET PREP: ABNORMAL
YEAST, WET PREP: ABNORMAL

## 2024-07-01 PROCEDURE — 87389 HIV-1 AG W/HIV-1&-2 AB AG IA: CPT | Performed by: ADVANCED PRACTICE MIDWIFE

## 2024-07-01 PROCEDURE — 86780 TREPONEMA PALLIDUM: CPT | Performed by: ADVANCED PRACTICE MIDWIFE

## 2024-07-01 PROCEDURE — 36415 COLL VENOUS BLD VENIPUNCTURE: CPT | Performed by: ADVANCED PRACTICE MIDWIFE

## 2024-07-01 PROCEDURE — 86901 BLOOD TYPING SEROLOGIC RH(D): CPT | Performed by: ADVANCED PRACTICE MIDWIFE

## 2024-07-01 PROCEDURE — 86900 BLOOD TYPING SEROLOGIC ABO: CPT | Performed by: ADVANCED PRACTICE MIDWIFE

## 2024-07-01 PROCEDURE — 87210 SMEAR WET MOUNT SALINE/INK: CPT | Performed by: ADVANCED PRACTICE MIDWIFE

## 2024-07-01 PROCEDURE — 85027 COMPLETE CBC AUTOMATED: CPT | Performed by: ADVANCED PRACTICE MIDWIFE

## 2024-07-01 PROCEDURE — 81003 URINALYSIS AUTO W/O SCOPE: CPT | Performed by: ADVANCED PRACTICE MIDWIFE

## 2024-07-01 PROCEDURE — 76817 TRANSVAGINAL US OBSTETRIC: CPT | Performed by: OBSTETRICS & GYNECOLOGY

## 2024-07-01 PROCEDURE — 87086 URINE CULTURE/COLONY COUNT: CPT | Performed by: ADVANCED PRACTICE MIDWIFE

## 2024-07-01 PROCEDURE — 99203 OFFICE O/P NEW LOW 30 MIN: CPT | Performed by: ADVANCED PRACTICE MIDWIFE

## 2024-07-01 PROCEDURE — 86762 RUBELLA ANTIBODY: CPT | Performed by: ADVANCED PRACTICE MIDWIFE

## 2024-07-01 PROCEDURE — 86803 HEPATITIS C AB TEST: CPT | Performed by: ADVANCED PRACTICE MIDWIFE

## 2024-07-01 PROCEDURE — 86850 RBC ANTIBODY SCREEN: CPT | Performed by: ADVANCED PRACTICE MIDWIFE

## 2024-07-01 PROCEDURE — 87340 HEPATITIS B SURFACE AG IA: CPT | Performed by: ADVANCED PRACTICE MIDWIFE

## 2024-07-02 PROBLEM — Z80.3 FAMILY HISTORY OF MALIGNANT NEOPLASM OF BREAST: Status: ACTIVE | Noted: 2024-07-02

## 2024-07-02 LAB — BACTERIA UR CULT: NORMAL

## 2024-07-02 NOTE — PROGRESS NOTES
America Pastor is here with Dinesh for a NOB intake.  US done prior with viable pregnancy at 10 1 by LMP consistant with US.    Doing well overall per pt report.  Reviewed CNM care and given booklet for NOB.  Has 1st trimester screen scheduled so will schedule follow up at 15 weeks for MSAFP.  ASSESSMENT: 10w2d  PLAN: RTC in 5 weeks for MSAFP.    America Pastor is a 34 year old   who is not a previous CNM patient.  She presents for a new OB Visit.         Patient's last menstrual period was 04/21/2024..  Estimated Date of Delivery: Jan 26, 2025.  Estimated Date of Delivery: Jan 26, 2025  correspond with Patient's last menstrual period was 04/21/2024.  She has not had bleeding since her LMP.  This was a planned pregnancy.   FOB is Dinesh who is in good health.  FOB IS actively involved in relationship with America Pastor and this pregnancy.        Current medications are:    Current Outpatient Medications:     Prenatal Vit-DSS-Fe Cbn-FA (PRENATAL AD PO), , Disp: , Rfl:      =========================================    INFECTION HISTORY  HIV: no  Hepatitis B: no  Hepatitis C: no  Tuberculosis: no  Last PPD   Herpes self: no  Herpes partner:  no  Chlamydia:  no  Gonorrhea:  no  HPV: no  BV:  no  Trichomoniasis:  no  Syphilis:  no  Chicken Pox:  yes  ============================================    PERSONAL/SOCIAL HISTORY  Lives lives with their spouse.  Exercise Habits:  walking 4-7 days per week.  Employment: Full time.  Her job involves light activity with little potential for toxic exposure.  Travel plans are none planned. Additional items: None  =============================================    REVIEW OF SYSTEMS  CONSTITUTIONAL:  She denies fever, chills and viral infections since her last LMP.  There is no fatigue.  Weigh loss has not occurred..  DIET: Appetite is increase.  Eats a Regular diet.    Recommend to gain 25-35 pounds with her pregnancy.  SKIN: Denies changes and suspicious moles or  lesions.  HEAD: No headache since LMP  has dizziness and fainting.  ENT: Denies blurred vision, hearing loss, tinnitus, frequent colds, epistaxis, hoarseness and tooth pain.    ENDOCRINE: Denies heat and cold intolerance, and polydipsia.    BREASTS:  Denies tenderness since LMP.  Denies discharge and lumps.  She does do SBE on a monthly basis.  HEART/LUNGS: Denies dyspnea, wheezing, coughing and chest pain.  HEMATOLOGIC: Denies tendency to bruise and history of blood clots.  GASTROINTESTINAL: Denies heartburn, indigestion and change of color in stools.  She has not had nausea..  Constipation has notbeen a problem since LMP.  She denies hemorrhoids.  Denies rectal bleeding.   GENITOURINARY:  Denies urgency, dysuria and hematuria.  Has frequency of urination since LMP.  She does not experience stress incontinence.  MUSCULOSKELETAL: Denies joint stiffness, pain and restricted motion.  NEUROLOGIC:  Denies seizures, weakness and fainting.  PSYCHIATRIC:  Denies mood changes  Has previous outpatient mental health treatment Anxiety.  ===========================================    PHYSICAL EXAM  GENERAL:  34 year old pleasant pregnant female, alert, cooperative and well groomed.  SKIN:  Warm and dry, without lesions or tenderness.    HEAD: Symmetrical features.  EYES:  PERRLA, wears no corective lenses.  EARS: Tympanic membranes gray, translucent and intact.  NOSE: No flaring, patent  MOUTH:  Buccal mucosa pink, moist without lesions.  Teeth in good repair.    NECK:  Thyroid without enlargement and nodules.  Lymph nodes not palpable.   LUNGS:  Clear to auscultation.  HEART:  RRR without murmur.  ABDOMEN: Soft without masses or tenderness.  No CVA tenderness.  Uterus not palpable.  No scars noted..  MUSCULOSKELETAL:  Full range of motion.  Pelvis proven to -pelvis not tested.  EXTREMITIES:  2+/2+ DTR, No edema. No significant varicosities.  ===================================================    PLAN:  GC/Chlamydia screening:  Declined  NOB Labs as appropriate for patient.     consult for US for AMA patients.  Pap as indicated.  Instructed on use of triage nurse line and contacting the on call CNM after hours in an emergency.      Genetic Screening testing reviewed:  Multiple Marker Screening (QUAD Test) :  which is performed between 15 and 20 wks and combines the patients age, and measurement of MSAFP, hCG, uE3 and Inhibin A from the maternal serum.  This screen detects 70% of Down Syndrome and 60% of Trisomy 18 infants with a 5% screen positive rate.   Patient will have MSAFP screen drawn: Yes .    Genetic Ultrasound which will be performed at 18-20 wks gestation will discover aneuploid markers in 60% of fetuses with Down Syndrome with 40% appearing normal by US.    Discussed the risks, benefits, side effects and alternative therapies for current prescribed and OTC medications.    Will return to the clinic in 5 weeks for her next routine prenatal check.  Will call to be seen sooner if problems arise.    Dat VO, BERTHA

## 2024-07-09 ENCOUNTER — PRE VISIT (OUTPATIENT)
Dept: MATERNAL FETAL MEDICINE | Facility: CLINIC | Age: 34
End: 2024-07-09
Payer: COMMERCIAL

## 2024-07-11 ENCOUNTER — OFFICE VISIT (OUTPATIENT)
Dept: MATERNAL FETAL MEDICINE | Facility: CLINIC | Age: 34
End: 2024-07-11
Attending: OBSTETRICS & GYNECOLOGY
Payer: COMMERCIAL

## 2024-07-11 ENCOUNTER — LAB (OUTPATIENT)
Dept: LAB | Facility: CLINIC | Age: 34
End: 2024-07-11
Attending: OBSTETRICS & GYNECOLOGY
Payer: COMMERCIAL

## 2024-07-11 ENCOUNTER — HOSPITAL ENCOUNTER (OUTPATIENT)
Dept: ULTRASOUND IMAGING | Facility: CLINIC | Age: 34
Discharge: HOME | End: 2024-07-11
Attending: OBSTETRICS & GYNECOLOGY
Payer: COMMERCIAL

## 2024-07-11 DIAGNOSIS — Z36.9 FIRST TRIMESTER SCREENING: Primary | ICD-10-CM

## 2024-07-11 DIAGNOSIS — Z31.430 ENCOUNTER OF FEMALE FOR TESTING FOR GENETIC DISEASE CARRIER STATUS FOR PROCREATIVE MANAGEMENT: ICD-10-CM

## 2024-07-11 DIAGNOSIS — O26.90 PREGNANCY RELATED CONDITION, ANTEPARTUM: ICD-10-CM

## 2024-07-11 DIAGNOSIS — Z36.9 ENCOUNTER FOR ANTENATAL SCREENING: Primary | ICD-10-CM

## 2024-07-11 DIAGNOSIS — Z36.9 ENCOUNTER FOR ANTENATAL SCREENING: ICD-10-CM

## 2024-07-11 PROCEDURE — 96040 HC GENETIC COUNSELING, EACH 30 MINUTES: CPT | Performed by: GENETIC COUNSELOR, MS

## 2024-07-11 PROCEDURE — 76817 TRANSVAGINAL US OBSTETRIC: CPT | Mod: 26 | Performed by: OBSTETRICS & GYNECOLOGY

## 2024-07-11 PROCEDURE — 76801 OB US < 14 WKS SINGLE FETUS: CPT | Mod: 26 | Performed by: OBSTETRICS & GYNECOLOGY

## 2024-07-11 PROCEDURE — 76817 TRANSVAGINAL US OBSTETRIC: CPT

## 2024-07-11 PROCEDURE — 36415 COLL VENOUS BLD VENIPUNCTURE: CPT

## 2024-07-11 NOTE — PROGRESS NOTES
"[facility]  Genetic Counseling Consult    Patient:  America Pastor YOB: 1990   Date of Service:  24   MRN: [id number]    America was seen at the [facility] [place] for genetic counseling consultation to discuss the options for testing for fetal chromosome abnormalities.  The patient was accompanied by her partner, Dinesh to today's visit.     IMPRESSION/ PLAN   1. America elected to proceed with NT ultrasound and Prequel NIPT through [facility]. She opted to screen for sex chromosome aneuploidies, and declined microdeletion conditions. Results are expected in 10-14 days. America requested that we do not leave results in her voicemail. Patient was informed that results will also be available via [employer].    2. America and Dinesh elected to pursue foresight expanded carrier screening for 267 conditions. Results are expected within 4 weeks and we will contact the couple once both are available. They requested we do not leave results in their voicemail and have both completed authorization to share protected health information today.     PREGNANCY HISTORY   /Parity:    Age at Delivery: 35 year old  Gestational Age: 11w4d   SHAUN: 2025, by Last Menstrual Period             No significant complications or exposures were reported in the current pregnancy.    MEDICAL HISTORY   America s reported medical history is not expected to impact pregnancy management or risks to fetal development.       FAMILY HISTORY   A three-generation pedigree was obtained today and is scanned under the \"Media\" tab in Epic. It is important to note that the family history provided is based on the patient's recollection and reported in the absence of medical records. If the family history changes or if more information is obtained, the patient should contact our clinic as this may alter recommendations.     The following significant " findings were reported today:   America reported that two of her maternal aunts have a history of breast cancer diagnosed in their 50's and 70's. Dinesh's paternal uncle reportedly passed away in his 50's due to colon cancer. Dinesh's paternal grandmother was reported to have colon cancer in her 50's and breast cancer in her 70's. Dinesh's maternal grandmother reportedly passed away in her 70's due to ovarian cancer. We discussed how most cancer seen in families occurs sporadically, but about 5-10% may be due to an underlying genetic etiology. The couple was encouraged to share this family history information with their primary care providers to ensure appropriate screening. They were also made aware of the [facility]'s cancer risk management clinic if they or their family members are interested in more information.   It was reported that America's maternal aunt had a history of intellectual and physical disabilities. Further details regarding an identified diagnosis were not known. We reviewed that in the absence of an identified cause for her health concerns, risk assessment is challenging.   It was reported that Dinesh's maternal first cousin has cerebral palsy. We reviewed that there are different types of cerebral palsy and many different underlying causes that can lead to the diagnosis. These can include delivery complications, brain injury, infection, certain genetic conditions, etc. Without specific information regarding an underlying etiology for the cerebral palsy identified in his cousin, risk assessment is challenging. This individual's mother also experienced several pregnancy losses and conceived three pregnancies via IVF. We discussed how recurrent pregnancy loss can sometimes be due to an underlying cause. Common causes of recurrent pregnancy loss include maternal factors, endocrine disorders, immune disorders, and chromosomal and single gene disorders. Without further information regarding an  underlying cause for these relative's recurrent pregnancy loss, an accurate risk assessment cannot be provided and the possibility of a familial chromosomal rearrangement cannot be ruled out. They were encouraged to reach out if any additional information is gathered.     Otherwise, the reported family history is unremarkable for multiple miscarriages, stillbirths, birth defects, intellectual disabilities, known genetic conditions, and consanguinity.       CARRIER SCREENING   Expanded carrier screening is available to screen for autosomal recessive conditions and X-linked conditions in a large list of genes. Autosomal recessive conditions happen when a mutation has been inherited from the egg and sperm and include conditions like cystic fibrosis, thalassemia, hearing loss, spinal muscular atrophy, and more. X-linked conditions happen when a mutation has been inherited from the egg and include conditions like fragile X syndrome.  screening was also reviewed.    We discussed that expanded carrier screening is designed to identify carrier status for conditions that are primarily childhood or adolescent onset. Expanded carrier screening does not evaluate for adult-onset conditions such as hereditary cancer syndromes, dementia/ Alzheimer's disease, or cardiovascular disease risk factors. Additionally, expanded carrier screening is not comprehensive for all known genetic diseases or inherited conditions. This is a screening test, and residual carrier status risk figures will be provided to the patient after results become available. Carrier screening is not meant to diagnose the patient with a condition, and generally carriers are asymptomatic. However, certain genes may confer increased risks for various health concerns in carriers (DMD, FMR1, etc). Patients are encouraged to share results with their primary care providers to ensure appropriate screening. If we are notified by the performing laboratory of a  variant reclassification, the patient will be contacted.     We briefly reviewed PRASANNA (Genetic Information Nondiscrimination Act). PRASANNA is a federal law that protects individuals from health insurance or employment discrimination based on a genetic test result alone.  There are currently no legal discrimination protections in terms of life insurance, long term care, or disability insurances. There are conditions included on carrier screening which may have health implications for individuals as carriers. Nathaly verbalized understanding and has elected to pursue testing.     We reviewed available panel sizes. Nathaly elected to pursue [facility] expanded carrier screening for 267 conditions. Results are expected within 4 weeks and we will contact the couple once both are available.       RISK ASSESSMENT FOR CHROMOSOME CONDITIONS   We explained that the risk for fetal chromosome abnormalities increases with maternal age. We discussed specific features of common chromosome abnormalities, including Down syndrome, trisomy 13, trisomy 18, and sex chromosome trisomies.    At age 35 at midtrimester, the risk to have a baby with Down syndrome is 1 in 342.   At age 35 at midtrimester, the risk to have a baby with any chromosome abnormality is 1 in  172.     GENETIC TESTING OPTIONS   Genetic testing during a pregnancy includes screening and diagnostic procedures.      We discussed the following screening options:   Non-invasive prenatal testing (NIPT)  Also called cell-free DNA screening because it detects chromosomes from the placenta in the pregnant person's blood  Can be done any time after 10 weeks gestation  Screens for trisomy 21, trisomy 18, trisomy 13, and sex chromosome aneuploidies  Cannot screen for open neural tube defects, maternal serum AFP after 15 weeks is recommended  We also discussed that current ACMG guidelines recommend that screening for 22q11.2 deletion syndrome be offered to all  pregnant patients. 22q11.2 deletion syndrome has an estimated prevalence of 1 in 990 to 1 in 2148 (0.05-0.1%). Risk is not thought to increase with maternal age. Clinical features are variable but include congenital heart defects, cleft palate, developmental delays, immune system deficiencies, and hearing loss. Approximately 90% of cases are de yulissa (a sporadic new change in a pregnancy). Cell-free DNA screening for 22q11.2 deletion syndrome is available with the inclusion of other microdeletion syndromes that are not currently recommended by society guidelines. We discussed the limitations of cell-free DNA screening in detecting microdeletions and the possiblity of false positives and false negatives.      We discussed the following ultrasound options:  Nuchal translucency (NT) ultrasound  Ultrasound between 11d1e-24w7i that includes nuchal translucency measurement and nasal bone assessments  Nuchal translucency refers to the space at the back of the neck where fluid builds up. All babies at this stage have fluid and there is only concern if there is too much fluid  Nasal bone refers to the small bone in the nose. There is concern for conditions like Down syndrome if the bone cannot be seen at all  This ultrasound can be done as part of first trimester screening, at the same time as another screen (NIPT), at the same time as a CVS, or if the patients does not want genetic screening.  Markers on ultrasound detects about 70% of pregnancies with aneuploidy  Abnormalities on NT ultrasound can also increase the risk for a birth defect, like a heart defect    We discussed the following diagnostic options:   Chorionic villus sampling (CVS)  Invasive diagnostic procedure done between 10w0d and 13w6d  The procedure collects a small sample from the placenta for the purpose of chromosomal testing and/or other genetic testing  Diagnostic result; more than 99% sensitivity for fetal chromosome abnormalities  Cannot screen for  open neural tube defects, maternal serum AFP after 15 weeks is recommended    Amniocentesis  Invasive diagnostic procedure done after 15 weeks gestation  The procedure collects a small sample of amniotic fluid for the purpose of chromosomal testing and/or other genetic testing  Diagnostic result; more than 99% sensitivity for fetal chromosome abnormalities  Testing for AFP in the amniotic fluid can test for open neural tube defects    The benefits and limitations of noninvasive screening were reviewed. Screening tests provide a risk assessment (chance) specific to the pregnancy for certain fetal chromosome abnormalities but cannot definitively diagnose or exclude a fetal chromosome abnormality. Follow-up genetic counseling and consideration of diagnostic testing is recommended with any abnormal screening result. Diagnostic testing during a pregnancy is more certain and can test for more conditions. However, the tests do have a risk of miscarriage that requires careful consideration. These tests can detect fetal chromosome abnormalities with greater than 99% certainty. Results can be compromised by maternal cell contamination or mosaicism and are limited by the resolution of current genetic testing technology. There is no screening or diagnostic test that detects all forms of birth defects or intellectual disability.     It was a pleasure to be involved with [facility]. Face-to-face time of the meeting was 45 minutes.    Tegan Forman MS, [facility]  Licensed Genetic Counselor  [facility]  Maternal Fetal Medicine  Ph: [phone]  [email or web address]

## 2024-07-11 NOTE — PROGRESS NOTES
"Please see \"Imaging\" tab under \"Chart Review\" for details of today's US at the [facility].    Parviz Sierra MD  Maternal-Fetal Medicine    "

## 2024-07-22 ENCOUNTER — TELEPHONE (OUTPATIENT)
Dept: MATERNAL FETAL MEDICINE | Facility: CLINIC | Age: 34
End: 2024-07-22
Payer: COMMERCIAL

## 2024-07-22 LAB — SCANNED LAB RESULT: NORMAL

## 2024-07-22 NOTE — TELEPHONE ENCOUNTER
July 22, 2024  I spoke with America regarding her Prequel  NIPT results.     Results indicate NO ANEUPLOIDY DETECTED for chromosomes 21, 18, 13, or sex chromosomes (XX). Predicted sex of baby was disclosed per patient request.     This puts her current pregnancy at low risk for Down syndrome, trisomy 18, trisomy 13 and sex chromosome abnormalities. This test is reported to have the following sensitivities: Down syndrome: 99.7%, trisomy 18: 97.9%, and trisomy 13: 99.0%. Although these results are reassuring, this does not replace a standard chromosome analysis from a chorionic villus sampling or amniocentesis.      MSAFP is the appropriate second trimester screening test for open neural tube defects; the maternal quad screen is not recommended.     Her results are available in her Epic chart for her primary OB to review.    Expanded carrier screening results are pending for the couple and we will contact them as soon as both are available.      Tegan Forman MS, [facility]  Licensed Genetic Counselor  [facility]  Maternal Fetal Medicine  Ph: [phone]  [email or web address]

## 2024-07-26 LAB — SCANNED LAB RESULT: ABNORMAL

## 2024-07-29 ENCOUNTER — TELEPHONE (OUTPATIENT)
Dept: MATERNAL FETAL MEDICINE | Facility: CLINIC | Age: 34
End: 2024-07-29
Payer: COMMERCIAL

## 2024-07-29 NOTE — TELEPHONE ENCOUNTER
2024    I spoke with America to review available results from the couple's [facility] expanded carrier screening through [employer]. The couple both signed authorization to share protected health information with each other.     America was identified to be a carrier for one condition on the panel of 267 and Dinesh was identified to be a carrier of 2 conditions on the panel.     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   If untreated, symptoms include neurological concerns and skin findings and can be very severe. There is a milder form of the condition called partial biotinidase deficeincy. Individuals with the mild form hay be asymptomatic or may intermittent symptoms including hypotonia, rashes, and hair loss.   Biotinidase deficiency is treatable with biotin supplementation. Biotinidase deficiency is on [place] Screening.   Both members of the couple have the same BTD variant: c.1330G>C. We reviewed autosomal recessive inheritance and 1/4 reproductive risk for the pregnancy to inherit both BTD variants. This is a very common variant in the general population and when homozygous in an individual, symptoms of even partial biotinidase deficiency are not expected. However, these individuals can screen positive on  Screening. Homozygotes for the [id number] pathogenic variant are expected to have approximately 45%-50% of mean normal serum biotinidase enzyme activity (which is similar to the activity of heterozygotes for profound biotinidase deficiency) and do not typically require biotin therapy.     We reviewed option of diagnostic testing (amniocentesis) for the BTD variants or plan for  evaluation. Diagnostic testing was declined. We reviewed  screen and that if normal, cannot rule out  homozygosity for the BTD as it could be due to enzyme activity above the screening threshold. They are interested in a  evaluation with medical genetics and a referral will be placed.     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 The R117H variant is associated with a broad  spectrum of disease. This variant in isolation is not expected to increase the possibility of classic cystic fibrosis, though it does increase the chance for certain CFTR-related conditions. Importantly, some studies indicate that individuals with an R117H variant in cis with an expanded poly-T tract and a concurrent severe mutation in trans may be at risk for pulmonary symptoms or pancreatic insufficiency later in their lifetimes. America was NOT identified to be a carrier of CFTR reducing the reproductive risk to 1/12,000.     First degree relatives have a 50% chance of being a carrier and this information should be shared with them. They have no additional questions at this time.     Tegan Forman MS, [facility]  Licensed Genetic Counselor  [facility]  Maternal Fetal Medicine  Ph: [phone]  [email or web address]

## 2024-08-05 ENCOUNTER — PRENATAL OFFICE VISIT (OUTPATIENT)
Dept: MIDWIFE SERVICES | Facility: CLINIC | Age: 34
End: 2024-08-05
Payer: COMMERCIAL

## 2024-08-05 VITALS
BODY MASS INDEX: 22.79 KG/M2 | OXYGEN SATURATION: 97 % | SYSTOLIC BLOOD PRESSURE: 116 MMHG | HEIGHT: 68 IN | HEART RATE: 78 BPM | WEIGHT: 150.41 LBS | DIASTOLIC BLOOD PRESSURE: 71 MMHG | TEMPERATURE: 97.6 F

## 2024-08-05 DIAGNOSIS — Z34.82 ENCOUNTER FOR SUPERVISION OF OTHER NORMAL PREGNANCY, SECOND TRIMESTER: Primary | ICD-10-CM

## 2024-08-05 PROCEDURE — 82105 ALPHA-FETOPROTEIN SERUM: CPT | Mod: 90 | Performed by: ADVANCED PRACTICE MIDWIFE

## 2024-08-05 PROCEDURE — 99000 SPECIMEN HANDLING OFFICE-LAB: CPT | Performed by: ADVANCED PRACTICE MIDWIFE

## 2024-08-05 PROCEDURE — 36415 COLL VENOUS BLD VENIPUNCTURE: CPT | Performed by: ADVANCED PRACTICE MIDWIFE

## 2024-08-05 PROCEDURE — 99207 PR PRENATAL VISIT: CPT | Performed by: ADVANCED PRACTICE MIDWIFE

## 2024-08-05 ASSESSMENT — PATIENT HEALTH QUESTIONNAIRE - PHQ9: SUM OF ALL RESPONSES TO PHQ QUESTIONS 1-9: 2

## 2024-08-05 NOTE — PROGRESS NOTES
15w1d  Feeling well. Still fatigued but not sleeping well. Discussed strategies. Ordered fetal survey in 4 wks. AFP today to complete screening. Happy to hear FHTs today. No other questions or concerns.  RTC in 4 wks TAMI

## 2024-08-07 LAB
# FETUSES US: NORMAL
AFP MOM SERPL: 1.26
AFP SERPL-MCNC: 38 NG/ML
AGE - REPORTED: 34.1 YR
CURRENT SMOKER: NO
FAMILY MEMBER DISEASES HX: NO
GA METHOD: NORMAL
GA: NORMAL WK
IDDM PATIENT QL: NO
INTEGRATED SCN PATIENT-IMP: NORMAL
SPECIMEN DRAWN SERPL: NORMAL

## 2024-08-07 NOTE — RESULT ENCOUNTER NOTE
Dear America,    Your test results are attached below. Great news. Your screening test was negative which means that your baby is not at risk for neural tube defects.  If you have any questions, please contact me via [employer] or you can call our office at [phone].    Shakila Kelley CNM, APRN BERTHA, CNM

## 2024-08-19 ENCOUNTER — DOCUMENTATION ONLY (OUTPATIENT)
Dept: MIDWIFE SERVICES | Facility: CLINIC | Age: 34
End: 2024-08-19
Payer: COMMERCIAL

## 2024-08-19 NOTE — PROGRESS NOTES
Prescription received for breast pump received from [facility], prescription signed/faxed back to [facility].  Emeli Cronin RN on 8/19/2024 at 11:38 AM

## 2024-09-03 ENCOUNTER — PRENATAL OFFICE VISIT (OUTPATIENT)
Dept: MIDWIFE SERVICES | Facility: CLINIC | Age: 34
End: 2024-09-03
Payer: COMMERCIAL

## 2024-09-03 ENCOUNTER — ANCILLARY PROCEDURE (OUTPATIENT)
Dept: ULTRASOUND IMAGING | Facility: CLINIC | Age: 34
End: 2024-09-03
Attending: ADVANCED PRACTICE MIDWIFE
Payer: COMMERCIAL

## 2024-09-03 VITALS
BODY MASS INDEX: 23.1 KG/M2 | WEIGHT: 151.9 LBS | SYSTOLIC BLOOD PRESSURE: 104 MMHG | HEART RATE: 87 BPM | DIASTOLIC BLOOD PRESSURE: 64 MMHG | OXYGEN SATURATION: 97 %

## 2024-09-03 DIAGNOSIS — Z34.82 ENCOUNTER FOR SUPERVISION OF OTHER NORMAL PREGNANCY, SECOND TRIMESTER: Primary | ICD-10-CM

## 2024-09-03 DIAGNOSIS — Z34.82 ENCOUNTER FOR SUPERVISION OF OTHER NORMAL PREGNANCY, SECOND TRIMESTER: ICD-10-CM

## 2024-09-03 PROCEDURE — 76805 OB US >/= 14 WKS SNGL FETUS: CPT | Performed by: OBSTETRICS & GYNECOLOGY

## 2024-09-03 PROCEDURE — 99207 PR PRENATAL VISIT: CPT | Performed by: ADVANCED PRACTICE MIDWIFE

## 2024-09-03 NOTE — PROGRESS NOTES
19w2d  America is here with Dinesh today. She had anatomy ultrasound today and preliminary results show normal anatomy but possible suboptimal cardiac views. Will await final report and if needed will order follow up US. Discussed upcoming GCT at 24w. No other questions today. Going on a trip to [place], discussed precautions for airplane travel in second trimester.    Eden Hidalgo, TAVO STONE

## 2024-09-04 DIAGNOSIS — Z34.02 ENCOUNTER FOR SUPERVISION OF NORMAL FIRST PREGNANCY, SECOND TRIMESTER: Primary | ICD-10-CM

## 2024-09-04 NOTE — PROGRESS NOTES
"Follow up ultrasound ordered in 1-2 wks to view anatomy not seen in first ultrasound. \"Face, Nose/Lips , Profile, 4 Chamber Heart, RVOT, and LVOT could not be seen due to fetal position.\"    Shakila López, JOSEM, APRN CNM      "

## 2024-09-04 NOTE — RESULT ENCOUNTER NOTE
"Please call patient and let her know that the OB/GYNs recommend repeating her ultrasound in 1-2 wks to better see anatomy not seen the first time because of the baby's position. . \"Face, Nose/Lips , Profile, 4 Chamber Heart, RVOT, and LVOT could not be seen due to fetal position.\".   Thanks,   Shakila López, CNM, APRN CNM CNM  "

## 2024-09-12 ENCOUNTER — MYC MEDICAL ADVICE (OUTPATIENT)
Dept: MIDWIFE SERVICES | Facility: CLINIC | Age: 34
End: 2024-09-12

## 2024-09-12 ENCOUNTER — ANCILLARY PROCEDURE (OUTPATIENT)
Dept: ULTRASOUND IMAGING | Facility: CLINIC | Age: 34
End: 2024-09-12
Attending: ADVANCED PRACTICE MIDWIFE
Payer: COMMERCIAL

## 2024-09-12 DIAGNOSIS — Z34.02 ENCOUNTER FOR SUPERVISION OF NORMAL FIRST PREGNANCY, SECOND TRIMESTER: ICD-10-CM

## 2024-09-12 PROCEDURE — 76816 OB US FOLLOW-UP PER FETUS: CPT | Performed by: OBSTETRICS & GYNECOLOGY

## 2024-10-14 ENCOUNTER — PRENATAL OFFICE VISIT (OUTPATIENT)
Dept: MIDWIFE SERVICES | Facility: CLINIC | Age: 34
End: 2024-10-14
Payer: COMMERCIAL

## 2024-10-14 VITALS
HEIGHT: 68 IN | WEIGHT: 158.8 LBS | SYSTOLIC BLOOD PRESSURE: 113 MMHG | HEART RATE: 67 BPM | DIASTOLIC BLOOD PRESSURE: 73 MMHG | OXYGEN SATURATION: 97 % | BODY MASS INDEX: 24.07 KG/M2

## 2024-10-14 DIAGNOSIS — Z23 HIGH PRIORITY FOR 2019-NCOV VACCINE: ICD-10-CM

## 2024-10-14 DIAGNOSIS — Z34.82 ENCOUNTER FOR SUPERVISION OF OTHER NORMAL PREGNANCY, SECOND TRIMESTER: ICD-10-CM

## 2024-10-14 DIAGNOSIS — Z34.02 ENCOUNTER FOR SUPERVISION OF NORMAL FIRST PREGNANCY IN SECOND TRIMESTER: Primary | ICD-10-CM

## 2024-10-14 LAB
GLUCOSE 1H P 50 G GLC PO SERPL-MCNC: 141 MG/DL (ref 70–129)
HGB BLD-MCNC: 13.3 G/DL (ref 11.7–15.7)
T PALLIDUM AB SER QL: NONREACTIVE

## 2024-10-14 PROCEDURE — 99207 PR PRENATAL VISIT: CPT | Performed by: ADVANCED PRACTICE MIDWIFE

## 2024-10-14 PROCEDURE — 86780 TREPONEMA PALLIDUM: CPT | Performed by: ADVANCED PRACTICE MIDWIFE

## 2024-10-14 PROCEDURE — 90480 ADMN SARSCOV2 VAC 1/ONLY CMP: CPT | Performed by: ADVANCED PRACTICE MIDWIFE

## 2024-10-14 PROCEDURE — 82950 GLUCOSE TEST: CPT | Performed by: ADVANCED PRACTICE MIDWIFE

## 2024-10-14 PROCEDURE — 91320 SARSCV2 VAC 30MCG TRS-SUC IM: CPT | Performed by: ADVANCED PRACTICE MIDWIFE

## 2024-10-14 PROCEDURE — 36415 COLL VENOUS BLD VENIPUNCTURE: CPT | Performed by: ADVANCED PRACTICE MIDWIFE

## 2024-10-14 NOTE — PROGRESS NOTES
25w1d  Patient is feeling well. Positive fetal movement. Denies water leaking, vaginal bleeding, decreased fetal movement, contraction pain, or other concerns.  Doing well and feeling good. Having some lower pelvic pressure that was happening when she was working out, specifically when doing lunges and then she noted it recently when walking her dogs. Wondering if there is anything she can or needs to do about that. We discussed eventually maybe considering a maternity support belt but also just adjusting exercises likes she has been to help. We also discussed staying hydrated as they might be jeffry schwab that feel like pressure from movement. She is going to [place], discussed may be more sensitive to elevation and just be mindful of that. GCT and HGB today. Reviewed vaccinations needed here over the next few visits and timing of them as she would like one at a time. Plans covid today. May consider flu elsewhere.   Danger signs discussed.   Knows when to call triage and has phone numbers to call.   Follow up in 4 weeks. Tdap vaccination next visit.   TAVO Carlos CNM

## 2024-10-16 DIAGNOSIS — R73.09 ELEVATED GLUCOSE: Primary | ICD-10-CM

## 2024-10-17 ENCOUNTER — LAB (OUTPATIENT)
Dept: LAB | Facility: CLINIC | Age: 34
End: 2024-10-17
Payer: COMMERCIAL

## 2024-10-17 DIAGNOSIS — R73.09 ELEVATED GLUCOSE: ICD-10-CM

## 2024-10-17 LAB
GESTATIONAL GTT 1 HR POST DOSE: 179 MG/DL (ref 60–179)
GESTATIONAL GTT 2 HR POST DOSE: 117 MG/DL (ref 60–154)
GESTATIONAL GTT 3 HR POST DOSE: 58 MG/DL (ref 60–139)
GLUCOSE P FAST SERPL-MCNC: 85 MG/DL (ref 60–94)

## 2024-10-17 PROCEDURE — 36415 COLL VENOUS BLD VENIPUNCTURE: CPT

## 2024-10-17 PROCEDURE — 82952 GTT-ADDED SAMPLES: CPT

## 2024-10-17 PROCEDURE — 82951 GLUCOSE TOLERANCE TEST (GTT): CPT

## 2024-11-11 ENCOUNTER — PRENATAL OFFICE VISIT (OUTPATIENT)
Dept: MIDWIFE SERVICES | Facility: CLINIC | Age: 34
End: 2024-11-11
Payer: COMMERCIAL

## 2024-11-11 VITALS
WEIGHT: 165 LBS | BODY MASS INDEX: 25.09 KG/M2 | HEART RATE: 89 BPM | OXYGEN SATURATION: 97 % | DIASTOLIC BLOOD PRESSURE: 72 MMHG | SYSTOLIC BLOOD PRESSURE: 120 MMHG

## 2024-11-11 DIAGNOSIS — Z34.93 PRENATAL CARE IN THIRD TRIMESTER: ICD-10-CM

## 2024-11-11 DIAGNOSIS — Z23 NEED FOR TDAP VACCINATION: Primary | ICD-10-CM

## 2024-11-11 NOTE — PROGRESS NOTES
Here with Dinesh.  Feeling well.  Baby is active. Denies any leaking of fluid, vaginal bleeding, regular uterine contractions, or headaches or other concerns.  She passed 3 hr GTT!  She agrees to recommended TDAP.  We also discussed flu shot and RSV vaccine. She would like to spread them out.    She noticed a blue bumpy spot on the right side of her vulva. Vulvar varicose vein by exam.    Reviewed to call for contractions, loss of fluid, vaginal bleeding, decreased fetal movement or any other questions or concerns.    RTC in 2 weeks.  Ofelia Argueta, DNP, APRN, CNM

## 2024-11-26 ENCOUNTER — TELEPHONE (OUTPATIENT)
Dept: MATERNAL FETAL MEDICINE | Facility: CLINIC | Age: 34
End: 2024-11-26
Payer: COMMERCIAL

## 2024-11-26 DIAGNOSIS — O28.5 ABNORMAL GENETIC TEST DURING PREGNANCY: Primary | ICD-10-CM

## 2024-11-26 NOTE — TELEPHONE ENCOUNTER
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 This is a very common variant in the general population and when homozygous in an individual, symptoms of even partial biotinidase deficiency are not expected. However, these individuals can screen positive on  Screening. Homozygotes for the [id number] pathogenic variant are expected to have approximately 45%-50% of mean normal serum biotinidase enzyme activity (which is similar to the activity of heterozygotes for profound biotinidase deficiency) and do not typically require biotin therapy. We reviewed  screen and that if normal, cannot rule out homozygosity for the BTD as it could be due to enzyme activity above the screening threshold.     Reviewed option of  medical genetics evaluation and testing around 4 weeks weeks of age. America shared she would like to review further with Dinesh, but they are comfortable with our team placing the referral and are aware their scheduling team will be reaching out to coordinate.      Tegan Forman MS, [facility]  Licensed Genetic Counselor  [facility]  Maternal Fetal Medicine  Ph: [phone]  [email or web address]

## 2024-11-27 ENCOUNTER — PRENATAL OFFICE VISIT (OUTPATIENT)
Dept: MIDWIFE SERVICES | Facility: CLINIC | Age: 34
End: 2024-11-27
Payer: COMMERCIAL

## 2024-11-27 VITALS
BODY MASS INDEX: 25.8 KG/M2 | DIASTOLIC BLOOD PRESSURE: 70 MMHG | OXYGEN SATURATION: 97 % | SYSTOLIC BLOOD PRESSURE: 116 MMHG | HEART RATE: 81 BPM | WEIGHT: 169.7 LBS

## 2024-11-27 DIAGNOSIS — Z34.03 ENCOUNTER FOR SUPERVISION OF NORMAL FIRST PREGNANCY, THIRD TRIMESTER: Primary | ICD-10-CM

## 2024-11-27 DIAGNOSIS — Z23 NEED FOR PROPHYLACTIC VACCINATION AND INOCULATION AGAINST INFLUENZA: ICD-10-CM

## 2024-11-27 NOTE — PROGRESS NOTES
31w3d    America presents today for a normal PNV. Discussed kick counts. Is feeling well. Positive fetal movement, denies LOF, or bleeding. Feels BH after exercising, specifically, aching in the low abdomen. Goes away with rest. Discussed importance of hydration. Denies urinary symptoms. Discussed signs of PTL. FH:30 FHR: 145  Cephalic via leopold's. Flu vaccine offered and accepted. Discussed recommended immunizations for people who are going to be around baby, specifically TDAP, Flu, and Covid-19. RTC in 2 weeks.    Marley COHEN    I was present with the CNM student who participated in the service and in the documentation of the services provided. I have verified the history and personally performed the physical exam and medical decision making, as documented by the student and edited by me.     Shakila López, BERTHA, APRN BERTHA GARCIAM

## 2024-12-03 ENCOUNTER — TELEPHONE (OUTPATIENT)
Dept: PEDIATRICS | Facility: CLINIC | Age: 34
End: 2024-12-03
Payer: COMMERCIAL

## 2024-12-03 NOTE — TELEPHONE ENCOUNTER
LVM for patient to call back to schedule new patient Metabolism appointment with Becki Lopes, with GC visit 30 minutes prior. Patient currently pregnant and appointment will be scheduled under patient's chart but will be switched to baby's chart once baby is born (due January 26th). Please schedule appointment around 4 weeks after due date. OK to schedule using NEW PEDS METABOLISM visit type. Thank you.

## 2024-12-09 ENCOUNTER — PRENATAL OFFICE VISIT (OUTPATIENT)
Dept: MIDWIFE SERVICES | Facility: CLINIC | Age: 34
End: 2024-12-09
Payer: COMMERCIAL

## 2024-12-09 VITALS
BODY MASS INDEX: 25.85 KG/M2 | DIASTOLIC BLOOD PRESSURE: 78 MMHG | TEMPERATURE: 97.3 F | SYSTOLIC BLOOD PRESSURE: 124 MMHG | WEIGHT: 170 LBS | HEART RATE: 94 BPM

## 2024-12-09 DIAGNOSIS — Z34.03 ENCOUNTER FOR SUPERVISION OF NORMAL FIRST PREGNANCY, THIRD TRIMESTER: Primary | ICD-10-CM

## 2024-12-09 DIAGNOSIS — Z13.71 SCREENING FOR GENETIC DISEASE CARRIER STATUS: ICD-10-CM

## 2024-12-09 DIAGNOSIS — Z29.11 NEED FOR RSV IMMUNIZATION: ICD-10-CM

## 2024-12-09 PROCEDURE — 90678 RSV VACC PREF BIVALENT IM: CPT | Performed by: ADVANCED PRACTICE MIDWIFE

## 2024-12-09 PROCEDURE — 90471 IMMUNIZATION ADMIN: CPT | Performed by: ADVANCED PRACTICE MIDWIFE

## 2024-12-09 PROCEDURE — 99207 PR PRENATAL VISIT: CPT | Performed by: ADVANCED PRACTICE MIDWIFE

## 2024-12-09 NOTE — PROGRESS NOTES
33w1d   Just attended CBE classes, questions answered.  They are hoping to come and take a tour soon and looking forward to that.  Plan to use [facility] for peds care.      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  Couple asking if they really needed to do it or if their  would be screened as part of the 24 hr universal screening.  Referred to their peds provider.     Questions about induction and end of pregnancy process, reviewed.  RSV vaccine today.  RTC 3 wks GBS. Hgb and brief BSUS then.  TAVO Law CNM

## 2024-12-30 ENCOUNTER — PRENATAL OFFICE VISIT (OUTPATIENT)
Dept: MIDWIFE SERVICES | Facility: CLINIC | Age: 34
End: 2024-12-30
Payer: COMMERCIAL

## 2024-12-30 VITALS
WEIGHT: 172 LBS | BODY MASS INDEX: 26.15 KG/M2 | HEART RATE: 78 BPM | DIASTOLIC BLOOD PRESSURE: 80 MMHG | TEMPERATURE: 97 F | SYSTOLIC BLOOD PRESSURE: 118 MMHG

## 2024-12-30 DIAGNOSIS — Z34.03 ENCOUNTER FOR SUPERVISION OF NORMAL FIRST PREGNANCY, THIRD TRIMESTER: Primary | ICD-10-CM

## 2024-12-30 LAB
ERYTHROCYTE [DISTWIDTH] IN BLOOD BY AUTOMATED COUNT: 13.1 % (ref 10–15)
HCT VFR BLD AUTO: 41.3 % (ref 35–47)
HGB BLD-MCNC: 13.4 G/DL (ref 11.7–15.7)
HOLD SPECIMEN: NORMAL
MCH RBC QN AUTO: 29.4 PG (ref 26.5–33)
MCHC RBC AUTO-ENTMCNC: 32.4 G/DL (ref 31.5–36.5)
MCV RBC AUTO: 91 FL (ref 78–100)
PLATELET # BLD AUTO: 186 10E3/UL (ref 150–450)
RBC # BLD AUTO: 4.56 10E6/UL (ref 3.8–5.2)
WBC # BLD AUTO: 14.2 10E3/UL (ref 4–11)

## 2024-12-30 PROCEDURE — 36415 COLL VENOUS BLD VENIPUNCTURE: CPT | Performed by: ADVANCED PRACTICE MIDWIFE

## 2024-12-30 PROCEDURE — 87653 STREP B DNA AMP PROBE: CPT | Performed by: ADVANCED PRACTICE MIDWIFE

## 2024-12-30 PROCEDURE — 85027 COMPLETE CBC AUTOMATED: CPT | Performed by: ADVANCED PRACTICE MIDWIFE

## 2024-12-30 PROCEDURE — 99207 PR PRENATAL VISIT: CPT | Performed by: ADVANCED PRACTICE MIDWIFE

## 2024-12-30 NOTE — PROGRESS NOTES
36w1d  Feeling well. Knows who to call in labor and when. Baby is cephalic by BSUS today. GBS and hgb . Reports good fetal movement. Denies leaking of fluid, vaginal bleeding, regular uterine contractions, headache or other concerns.  RTC in 1 wk [facility]

## 2024-12-31 LAB — GP B STREP DNA SPEC QL NAA+PROBE: NEGATIVE

## 2025-01-01 NOTE — RESULT ENCOUNTER NOTE
Dear America,    Your test results are attached below. Your hemoglobin is at a good level and you tested negative for GBS.  If you have any questions, please contact me via [employer] or you can call our office at [phone].    Shakila Kelley, BERTHA, APRN BERTHA, CNM

## 2025-01-07 ENCOUNTER — PRENATAL OFFICE VISIT (OUTPATIENT)
Dept: MIDWIFE SERVICES | Facility: CLINIC | Age: 35
End: 2025-01-07
Payer: COMMERCIAL

## 2025-01-07 VITALS
BODY MASS INDEX: 26.3 KG/M2 | OXYGEN SATURATION: 96 % | WEIGHT: 173 LBS | SYSTOLIC BLOOD PRESSURE: 112 MMHG | HEART RATE: 67 BPM | DIASTOLIC BLOOD PRESSURE: 70 MMHG

## 2025-01-07 DIAGNOSIS — Z34.03 ENCOUNTER FOR SUPERVISION OF NORMAL FIRST PREGNANCY, THIRD TRIMESTER: Primary | ICD-10-CM

## 2025-01-07 PROCEDURE — 99207 PR PRENATAL VISIT: CPT | Performed by: ADVANCED PRACTICE MIDWIFE

## 2025-01-07 ASSESSMENT — PATIENT HEALTH QUESTIONNAIRE - PHQ9
10. IF YOU CHECKED OFF ANY PROBLEMS, HOW DIFFICULT HAVE THESE PROBLEMS MADE IT FOR YOU TO DO YOUR WORK, TAKE CARE OF THINGS AT HOME, OR GET ALONG WITH OTHER PEOPLE: NOT DIFFICULT AT ALL
SUM OF ALL RESPONSES TO PHQ QUESTIONS 1-9: 2
SUM OF ALL RESPONSES TO PHQ QUESTIONS 1-9: 2

## 2025-01-07 NOTE — PROGRESS NOTES
37w2d  Feeling well, no concerns. Baby is active. No regular contractions, LOF or bleeding.   Has phone numbers, knows where to go.   Will schedule weekly visits. MML  Answers submitted by the patient for this visit:  Patient Health Questionnaire (Submitted on 1/7/2025)  If you checked off any problems, how difficult have these problems made it for you to do your work, take care of things at home, or get along with other people?: Not difficult at all  PHQ9 TOTAL SCORE: 2

## 2025-01-13 ENCOUNTER — PRENATAL OFFICE VISIT (OUTPATIENT)
Dept: MIDWIFE SERVICES | Facility: CLINIC | Age: 35
End: 2025-01-13
Payer: COMMERCIAL

## 2025-01-13 VITALS
OXYGEN SATURATION: 98 % | BODY MASS INDEX: 26.46 KG/M2 | HEART RATE: 96 BPM | WEIGHT: 174 LBS | DIASTOLIC BLOOD PRESSURE: 77 MMHG | SYSTOLIC BLOOD PRESSURE: 139 MMHG

## 2025-01-13 DIAGNOSIS — Z34.03 ENCOUNTER FOR SUPERVISION OF NORMAL FIRST PREGNANCY IN THIRD TRIMESTER: Primary | ICD-10-CM

## 2025-01-13 DIAGNOSIS — M41.9 SCOLIOSIS, UNSPECIFIED SCOLIOSIS TYPE, UNSPECIFIED SPINAL REGION: ICD-10-CM

## 2025-01-13 PROCEDURE — 99207 PR PRENATAL VISIT: CPT | Performed by: ADVANCED PRACTICE MIDWIFE

## 2025-01-13 NOTE — PROGRESS NOTES
"38w1d  America is feeling well today. Here with Dinesh. Reports good fetal movement. Denies leaking of fluid, vaginal bleeding, regular uterine contractions, headache, visual changes, or other concerns. She has birth plan with her today. Hoping to avoid epidural until after 8cm and use nitrous oxide before then. Does not want IV fentanyl. Hoping to avoid interventions. She has \"slight scoliosis\" diagnosed in childhood. This diagnosis doesn't require prenatal anesthesia consult but will let anesthesia know in hospital. Feeling ready for baby. No other questions/concerns. RTC weekly.    TAVO Daley CNM      "

## 2025-01-16 NOTE — PATIENT INSTRUCTIONS
"Week 39 of Your Pregnancy: Care Instructions     babies can look different than what you see in pictures or movies. Their heads can be a strange shape right after birth. And they may have swollen eyes and red marks on their faces.   You can still get pregnant even if you are breastfeeding. If you don't want to get pregnant, talk to your doctor about birth control.   Tips for week 39 of pregnancy  If you plan to breastfeed, get prepared.       Continue to eat healthy foods.  Keep taking your prenatal vitamins during breastfeeding if your doctor recommends it.  Talk to your doctor before taking any medicines or supplements.  Choose the right birth control for you.       Intrauterine devices (IUDs) are placed in the uterus. Sometimes the IUD can be placed right after giving birth. They work for years.  Hormonal implants are placed under the skin of the arm. They also work for years.  Depo-Provera is a shot. You get it every 3 months.  Birth control pills can be used. They're taken every day.  Tubal ligation (tying your tubes) and vasectomy are surgeries. They're permanent.  Diaphragms, spermicide, and condoms must be used each time you have sex. If you used a diaphragm before, you should get refitted after the baby is born.  A birth control patch or ring can be used. These just can't be started until several weeks after you give birth.  Follow-up care is a key part of your treatment and safety. Be sure to make and go to all appointments, and call your doctor if you are having problems. It's also a good idea to know your test results and keep a list of the medicines you take.  Where can you learn more?  Go to https://www.[employer].net/patiented  Enter A811 in the search box to learn more about \"Week 39 of Your Pregnancy: Care Instructions.\"  Current as of: 2024  Content Version: 14.3    2024 [employer].   Care instructions adapted under license by your healthcare professional. If you have " questions about a medical condition or this instruction, always ask your healthcare professional. [employer] disclaims any warranty or liability for your use of this information.

## 2025-01-20 ENCOUNTER — PRENATAL OFFICE VISIT (OUTPATIENT)
Dept: MIDWIFE SERVICES | Facility: CLINIC | Age: 35
End: 2025-01-20
Payer: COMMERCIAL

## 2025-01-20 VITALS
DIASTOLIC BLOOD PRESSURE: 68 MMHG | HEART RATE: 85 BPM | BODY MASS INDEX: 26.27 KG/M2 | TEMPERATURE: 98.6 F | OXYGEN SATURATION: 98 % | SYSTOLIC BLOOD PRESSURE: 114 MMHG | WEIGHT: 172.8 LBS

## 2025-01-20 DIAGNOSIS — Z34.93 PRENATAL CARE IN THIRD TRIMESTER: Primary | ICD-10-CM

## 2025-01-20 PROCEDURE — 99207 PR PRENATAL VISIT: CPT | Performed by: ADVANCED PRACTICE MIDWIFE

## 2025-01-20 NOTE — PROGRESS NOTES
Here with Dinesh.  Feeling well.  Baby is active. Denies any leaking of fluid, vaginal bleeding, regular uterine contractions, or headaches or other concerns.  Cervical exam offered and declined. They will consider for next week.  We discussed 40 week appt and scheduling induction for post dates testing. She would like to avoid induction if possible.  1  Reviewed to call for contractions, loss of fluid, vaginal bleeding, decreased fetal movement or any other questions or concerns.    RTC in 1 weeks.  Ofelia Argueta, CASI, APRN, CNM

## 2025-01-23 ENCOUNTER — NURSE TRIAGE (OUTPATIENT)
Dept: NURSING | Facility: CLINIC | Age: 35
End: 2025-01-23
Payer: COMMERCIAL

## 2025-01-23 ENCOUNTER — TELEPHONE (OUTPATIENT)
Dept: MIDWIFE SERVICES | Facility: CLINIC | Age: 35
End: 2025-01-23
Payer: COMMERCIAL

## 2025-01-23 NOTE — TELEPHONE ENCOUNTER
"OB Triage Call    Is patient's OB/Midwife with the [facility] or [facility]? [facility]- Proceed with triage     Reason for call: Leaking fluid    Assessment: Dinesh, Spouse, is calling. About 1 minute after going to the bathroom she had a gush of fluid come out. They stated it was about 1/3 cup of fluid.     Plan: Per Protocol patient should be seen in L&D.     Patient plans to deliver at [facility]    Patient's primary OB Provider is [place] [facility].     Per protocol recommendations Consult needed for  MD or Midwife.  Patient's primary OB is [facility]. Paged on-call midwife for patient's primary OB clinic (refer to where patient is seen as midwives may go to multiple locations) [place] to call FNA back at [phone].  Call returned at 5:37pm and advised on triage assessment. Does midwife recommend L&D evaluation? No- Per midwife on-call Monitor for contractions or continued leaking fluids but BERTHA Jung stated she will call them and notify them.         39w4d    Estimated Date of Delivery: 2025        OB History    Para Term  AB Living   1 0 0 0 0 0   SAB IAB Ectopic Multiple Live Births   0 0 0 0 0      # Outcome Date GA Lbr Arvind/2nd Weight Sex Type Anes PTL Lv   1 Current                No results found for: \"GBS\"       Gisella Frias, RN   Reason for Disposition   Leakage of fluid from vagina  (Exception: Patient is uncertain, but thinks it might be urine incontinence.)    Additional Information   Negative: [1] SEVERE abdominal pain (e.g., excruciating) AND [2] constant   Negative: SEVERE bleeding (e.g., continuous red blood from vagina, or large blood clots)   Negative: Umbilical cord hanging out of the vagina (shiny, white, curled appearance, \"like telephone cord\")   Negative: Can see baby   Negative: Uncontrollable urge to push (i.e., feels like baby is coming out now)   Negative: Sounds like a life-threatening emergency to the triager   Negative: < " 20 weeks pregnant   Negative: Vaginal bleeding    Protocols used: Pregnancy - Rupture of Membranes [id number]

## 2025-01-23 NOTE — TELEPHONE ENCOUNTER
Paged by FNA about patient calling with c/o leaking fluid after going to the bathroom. Patient states she voided and when she stood up she had another gush of fluid. None since. No vaginal bleeding. Baby is active. GBS negative. We discussed signs of SROM and labor, when to call or come in for evaluation. Offered for patient to come to hospital at any time for evaluation if more comfortable with that as opposed to monitoring at home. Plan for now to monitor. Will call with continued leaking fluid, signs of labor, decreased fetal movement or vaginal bleeding. Gave patient my pager number for direct contact with any questions or concerns. Kirti Jung CNM

## 2025-01-27 ENCOUNTER — PRENATAL OFFICE VISIT (OUTPATIENT)
Dept: MIDWIFE SERVICES | Facility: CLINIC | Age: 35
End: 2025-01-27
Payer: COMMERCIAL

## 2025-01-27 VITALS — DIASTOLIC BLOOD PRESSURE: 70 MMHG | SYSTOLIC BLOOD PRESSURE: 118 MMHG | WEIGHT: 178 LBS | BODY MASS INDEX: 27.06 KG/M2

## 2025-01-27 DIAGNOSIS — Z34.93 PRENATAL CARE IN THIRD TRIMESTER: Primary | ICD-10-CM

## 2025-01-27 PROCEDURE — 99207 PR PRENATAL VISIT: CPT | Performed by: ADVANCED PRACTICE MIDWIFE

## 2025-01-27 NOTE — PROGRESS NOTES
Feeling well.  Baby is active. Denies any leaking of fluid, vaginal bleeding, regular uterine contractions, or headaches or other concerns.  We discussed induction or post dates testing.  They would like post dates testing. Since 41 weeks is Sunday, MAIRA US and CNM visit with NST ordered for Friday 1/31.  I advised them that this had to be on this date.  We are at [place] and we were unable to reach anyone at the  at [place] to help them schedule. I wrote the RN Triage pool that America and Dinesh would contact them if they had trouble scheduling.   We discuss possible membrane sweep if she is still pregnant on Friday.   Reviewed to call for contractions, loss of fluid, vaginal bleeding, decreased fetal movement or any other questions or concerns.    Return to clinic Friday at [place] Office for US, NST and follow up with BERTHA - I just checked and appts are scheduled approptriately.  Ofelia Argueta, CASI, APRN, BERTHA

## 2025-01-31 ENCOUNTER — APPOINTMENT (OUTPATIENT)
Dept: OBGYN | Facility: CLINIC | Age: 35
End: 2025-01-31
Payer: COMMERCIAL

## 2025-01-31 ENCOUNTER — ANCILLARY PROCEDURE (OUTPATIENT)
Dept: ULTRASOUND IMAGING | Facility: CLINIC | Age: 35
End: 2025-01-31
Attending: ADVANCED PRACTICE MIDWIFE
Payer: COMMERCIAL

## 2025-01-31 DIAGNOSIS — Z34.93 PRENATAL CARE IN THIRD TRIMESTER: ICD-10-CM

## 2025-01-31 PROCEDURE — 76815 OB US LIMITED FETUS(S): CPT | Performed by: OBSTETRICS & GYNECOLOGY

## 2025-02-04 ENCOUNTER — HOSPITAL ENCOUNTER (INPATIENT)
Facility: CLINIC | Age: 35
LOS: 2 days | Discharge: HOME | End: 2025-02-06
Attending: ADVANCED PRACTICE MIDWIFE | Admitting: ADVANCED PRACTICE MIDWIFE
Payer: COMMERCIAL

## 2025-02-04 ENCOUNTER — PRENATAL OFFICE VISIT (OUTPATIENT)
Dept: MIDWIFE SERVICES | Facility: CLINIC | Age: 35
End: 2025-02-04
Payer: COMMERCIAL

## 2025-02-04 ENCOUNTER — ANESTHESIA EVENT (OUTPATIENT)
Dept: OBGYN | Facility: CLINIC | Age: 35
End: 2025-02-04
Payer: COMMERCIAL

## 2025-02-04 ENCOUNTER — ANESTHESIA (OUTPATIENT)
Dept: OBGYN | Facility: CLINIC | Age: 35
End: 2025-02-04
Payer: COMMERCIAL

## 2025-02-04 ENCOUNTER — APPOINTMENT (OUTPATIENT)
Dept: OBGYN | Facility: CLINIC | Age: 35
End: 2025-02-04
Payer: COMMERCIAL

## 2025-02-04 VITALS — DIASTOLIC BLOOD PRESSURE: 74 MMHG | BODY MASS INDEX: 27.22 KG/M2 | SYSTOLIC BLOOD PRESSURE: 114 MMHG | WEIGHT: 179 LBS

## 2025-02-04 DIAGNOSIS — O48.0 POST-TERM PREGNANCY, 40-42 WEEKS OF GESTATION: Primary | ICD-10-CM

## 2025-02-04 LAB
ABO + RH BLD: NORMAL
BLD GP AB SCN SERPL QL: NEGATIVE
ERYTHROCYTE [DISTWIDTH] IN BLOOD BY AUTOMATED COUNT: 13.5 % (ref 10–15)
HCT VFR BLD AUTO: 40.9 % (ref 35–47)
HGB BLD-MCNC: 14 G/DL (ref 11.7–15.7)
MCH RBC QN AUTO: 30.1 PG (ref 26.5–33)
MCHC RBC AUTO-ENTMCNC: 34.2 G/DL (ref 31.5–36.5)
MCV RBC AUTO: 88 FL (ref 78–100)
PLATELET # BLD AUTO: 174 10E3/UL (ref 150–450)
RBC # BLD AUTO: 4.65 10E6/UL (ref 3.8–5.2)
SPECIMEN EXP DATE BLD: NORMAL
T PALLIDUM AB SER QL: NONREACTIVE
WBC # BLD AUTO: 16.7 10E3/UL (ref 4–11)

## 2025-02-04 PROCEDURE — 3E0R3BZ INTRODUCTION OF ANESTHETIC AGENT INTO SPINAL CANAL, PERCUTANEOUS APPROACH: ICD-10-PCS | Performed by: ANESTHESIOLOGY

## 2025-02-04 PROCEDURE — 0KQM0ZZ REPAIR PERINEUM MUSCLE, OPEN APPROACH: ICD-10-PCS | Performed by: ADVANCED PRACTICE MIDWIFE

## 2025-02-04 PROCEDURE — 99207 PR PRENATAL VISIT: CPT | Performed by: ADVANCED PRACTICE MIDWIFE

## 2025-02-04 PROCEDURE — 250N000011 HC RX IP 250 OP 636: Performed by: ADVANCED PRACTICE MIDWIFE

## 2025-02-04 PROCEDURE — 722N000001 HC LABOR CARE VAGINAL DELIVERY SINGLE

## 2025-02-04 PROCEDURE — 250N000011 HC RX IP 250 OP 636

## 2025-02-04 PROCEDURE — 86850 RBC ANTIBODY SCREEN: CPT | Performed by: ADVANCED PRACTICE MIDWIFE

## 2025-02-04 PROCEDURE — 86900 BLOOD TYPING SEROLOGIC ABO: CPT | Performed by: ADVANCED PRACTICE MIDWIFE

## 2025-02-04 PROCEDURE — 59414 DELIVER PLACENTA: CPT | Performed by: OBSTETRICS & GYNECOLOGY

## 2025-02-04 PROCEDURE — 85048 AUTOMATED LEUKOCYTE COUNT: CPT | Performed by: ADVANCED PRACTICE MIDWIFE

## 2025-02-04 PROCEDURE — 59400 OBSTETRICAL CARE: CPT | Performed by: ADVANCED PRACTICE MIDWIFE

## 2025-02-04 PROCEDURE — 59025 FETAL NON-STRESS TEST: CPT | Performed by: ADVANCED PRACTICE MIDWIFE

## 2025-02-04 PROCEDURE — 370N000003 HC ANESTHESIA WARD SERVICE: Performed by: ANESTHESIOLOGY

## 2025-02-04 PROCEDURE — 85018 HEMOGLOBIN: CPT | Performed by: ADVANCED PRACTICE MIDWIFE

## 2025-02-04 PROCEDURE — 258N000003 HC RX IP 258 OP 636: Performed by: ADVANCED PRACTICE MIDWIFE

## 2025-02-04 PROCEDURE — 00HU33Z INSERTION OF INFUSION DEVICE INTO SPINAL CANAL, PERCUTANEOUS APPROACH: ICD-10-PCS | Performed by: ANESTHESIOLOGY

## 2025-02-04 PROCEDURE — 250N000009 HC RX 250: Performed by: ADVANCED PRACTICE MIDWIFE

## 2025-02-04 PROCEDURE — 59300 EPISIOTOMY OR VAGINAL REPAIR: CPT | Performed by: OBSTETRICS & GYNECOLOGY

## 2025-02-04 PROCEDURE — 86780 TREPONEMA PALLIDUM: CPT | Performed by: ADVANCED PRACTICE MIDWIFE

## 2025-02-04 PROCEDURE — 120N000002 HC R&B MED SURG/OB UMMC

## 2025-02-04 PROCEDURE — 250N000011 HC RX IP 250 OP 636: Mod: JZ

## 2025-02-04 PROCEDURE — 10D17Z9 MANUAL EXTRACTION OF PRODUCTS OF CONCEPTION, RETAINED, VIA NATURAL OR ARTIFICIAL OPENING: ICD-10-PCS | Performed by: OBSTETRICS & GYNECOLOGY

## 2025-02-04 RX ORDER — METHYLERGONOVINE MALEATE 0.2 MG/ML
200 INJECTION INTRAVENOUS
Status: DISCONTINUED | OUTPATIENT
Start: 2025-02-04 | End: 2025-02-06 | Stop reason: HOSPADM

## 2025-02-04 RX ORDER — LOPERAMIDE HYDROCHLORIDE 2 MG/1
4 CAPSULE ORAL
Status: DISCONTINUED | OUTPATIENT
Start: 2025-02-04 | End: 2025-02-06 | Stop reason: HOSPADM

## 2025-02-04 RX ORDER — CEFAZOLIN SODIUM 2 G/100ML
INJECTION, SOLUTION INTRAVENOUS
Status: DISCONTINUED
Start: 2025-02-04 | End: 2025-02-05 | Stop reason: HOSPADM

## 2025-02-04 RX ORDER — OXYTOCIN 10 [USP'U]/ML
10 INJECTION, SOLUTION INTRAMUSCULAR; INTRAVENOUS
Status: DISCONTINUED | OUTPATIENT
Start: 2025-02-04 | End: 2025-02-06 | Stop reason: HOSPADM

## 2025-02-04 RX ORDER — MODIFIED LANOLIN
OINTMENT (GRAM) TOPICAL
Status: DISCONTINUED | OUTPATIENT
Start: 2025-02-04 | End: 2025-02-06 | Stop reason: HOSPADM

## 2025-02-04 RX ORDER — TRANEXAMIC ACID 10 MG/ML
1 INJECTION, SOLUTION INTRAVENOUS EVERY 30 MIN PRN
Status: DISCONTINUED | OUTPATIENT
Start: 2025-02-04 | End: 2025-02-04 | Stop reason: HOSPADM

## 2025-02-04 RX ORDER — NALOXONE HYDROCHLORIDE 0.4 MG/ML
0.4 INJECTION, SOLUTION INTRAMUSCULAR; INTRAVENOUS; SUBCUTANEOUS
Status: DISCONTINUED | OUTPATIENT
Start: 2025-02-04 | End: 2025-02-04 | Stop reason: HOSPADM

## 2025-02-04 RX ORDER — IBUPROFEN 800 MG/1
800 TABLET, FILM COATED ORAL
Status: DISCONTINUED | OUTPATIENT
Start: 2025-02-04 | End: 2025-02-06 | Stop reason: HOSPADM

## 2025-02-04 RX ORDER — CEFAZOLIN SODIUM 2 G/100ML
2 INJECTION, SOLUTION INTRAVENOUS ONCE
Status: COMPLETED | OUTPATIENT
Start: 2025-02-04 | End: 2025-02-04

## 2025-02-04 RX ORDER — MISOPROSTOL 200 UG/1
800 TABLET ORAL
Status: DISCONTINUED | OUTPATIENT
Start: 2025-02-04 | End: 2025-02-04 | Stop reason: HOSPADM

## 2025-02-04 RX ORDER — PROCHLORPERAZINE MALEATE 10 MG
10 TABLET ORAL EVERY 6 HOURS PRN
Status: DISCONTINUED | OUTPATIENT
Start: 2025-02-04 | End: 2025-02-04 | Stop reason: HOSPADM

## 2025-02-04 RX ORDER — OXYTOCIN/0.9 % SODIUM CHLORIDE 30/500 ML
1-24 PLASTIC BAG, INJECTION (ML) INTRAVENOUS CONTINUOUS
Status: DISCONTINUED | OUTPATIENT
Start: 2025-02-04 | End: 2025-02-04

## 2025-02-04 RX ORDER — FENTANYL CITRATE-0.9 % NACL/PF 10 MCG/ML
PLASTIC BAG, INJECTION (ML) INTRAVENOUS
Status: DISCONTINUED
Start: 2025-02-04 | End: 2025-02-05 | Stop reason: HOSPADM

## 2025-02-04 RX ORDER — FENTANYL CITRATE-0.9 % NACL/PF 10 MCG/ML
100 PLASTIC BAG, INJECTION (ML) INTRAVENOUS EVERY 5 MIN PRN
Status: DISCONTINUED | OUTPATIENT
Start: 2025-02-04 | End: 2025-02-04

## 2025-02-04 RX ORDER — LIDOCAINE 40 MG/G
CREAM TOPICAL
Status: DISCONTINUED | OUTPATIENT
Start: 2025-02-04 | End: 2025-02-04

## 2025-02-04 RX ORDER — LOPERAMIDE HYDROCHLORIDE 2 MG/1
2 CAPSULE ORAL
Status: DISCONTINUED | OUTPATIENT
Start: 2025-02-04 | End: 2025-02-06 | Stop reason: HOSPADM

## 2025-02-04 RX ORDER — NALBUPHINE HYDROCHLORIDE 10 MG/ML
2.5-5 INJECTION INTRAMUSCULAR; INTRAVENOUS; SUBCUTANEOUS EVERY 6 HOURS PRN
OUTPATIENT
Start: 2025-02-04

## 2025-02-04 RX ORDER — MISOPROSTOL 200 UG/1
400 TABLET ORAL
Status: DISCONTINUED | OUTPATIENT
Start: 2025-02-04 | End: 2025-02-06 | Stop reason: HOSPADM

## 2025-02-04 RX ORDER — NALOXONE HYDROCHLORIDE 0.4 MG/ML
0.2 INJECTION, SOLUTION INTRAMUSCULAR; INTRAVENOUS; SUBCUTANEOUS
Status: DISCONTINUED | OUTPATIENT
Start: 2025-02-04 | End: 2025-02-04 | Stop reason: HOSPADM

## 2025-02-04 RX ORDER — CARBOPROST TROMETHAMINE 250 UG/ML
250 INJECTION, SOLUTION INTRAMUSCULAR
Status: DISCONTINUED | OUTPATIENT
Start: 2025-02-04 | End: 2025-02-06 | Stop reason: HOSPADM

## 2025-02-04 RX ORDER — IBUPROFEN 800 MG/1
800 TABLET, FILM COATED ORAL EVERY 6 HOURS PRN
Status: DISCONTINUED | OUTPATIENT
Start: 2025-02-04 | End: 2025-02-06 | Stop reason: HOSPADM

## 2025-02-04 RX ORDER — METHYLERGONOVINE MALEATE 0.2 MG/ML
200 INJECTION INTRAVENOUS
Status: DISCONTINUED | OUTPATIENT
Start: 2025-02-04 | End: 2025-02-04 | Stop reason: HOSPADM

## 2025-02-04 RX ORDER — TRANEXAMIC ACID 10 MG/ML
1 INJECTION, SOLUTION INTRAVENOUS EVERY 30 MIN PRN
Status: DISCONTINUED | OUTPATIENT
Start: 2025-02-04 | End: 2025-02-06 | Stop reason: HOSPADM

## 2025-02-04 RX ORDER — CEFAZOLIN SODIUM/WATER 2 G/20 ML
SYRINGE (ML) INTRAVENOUS
Status: DISCONTINUED
Start: 2025-02-04 | End: 2025-02-04 | Stop reason: WASHOUT

## 2025-02-04 RX ORDER — LIDOCAINE 40 MG/G
CREAM TOPICAL
Status: DISCONTINUED | OUTPATIENT
Start: 2025-02-04 | End: 2025-02-04 | Stop reason: HOSPADM

## 2025-02-04 RX ORDER — ACETAMINOPHEN 325 MG/1
650 TABLET ORAL EVERY 4 HOURS PRN
Status: DISCONTINUED | OUTPATIENT
Start: 2025-02-04 | End: 2025-02-06 | Stop reason: HOSPADM

## 2025-02-04 RX ORDER — FENTANYL/ROPIVACAINE/NS/PF 2MCG/ML-.1
PLASTIC BAG, INJECTION (ML) EPIDURAL
Status: COMPLETED
Start: 2025-02-04 | End: 2025-02-04

## 2025-02-04 RX ORDER — CEFAZOLIN SODIUM 1 G
2 VIAL (EA) INJECTION ONCE
Status: DISCONTINUED | OUTPATIENT
Start: 2025-02-04 | End: 2025-02-04

## 2025-02-04 RX ORDER — CARBOPROST TROMETHAMINE 250 UG/ML
250 INJECTION, SOLUTION INTRAMUSCULAR
Status: DISCONTINUED | OUTPATIENT
Start: 2025-02-04 | End: 2025-02-04 | Stop reason: HOSPADM

## 2025-02-04 RX ORDER — HYDROCORTISONE 25 MG/G
CREAM TOPICAL 3 TIMES DAILY PRN
Status: DISCONTINUED | OUTPATIENT
Start: 2025-02-04 | End: 2025-02-06 | Stop reason: HOSPADM

## 2025-02-04 RX ORDER — METOCLOPRAMIDE 10 MG/1
10 TABLET ORAL EVERY 6 HOURS PRN
Status: DISCONTINUED | OUTPATIENT
Start: 2025-02-04 | End: 2025-02-04 | Stop reason: HOSPADM

## 2025-02-04 RX ORDER — OXYTOCIN 10 [USP'U]/ML
10 INJECTION, SOLUTION INTRAMUSCULAR; INTRAVENOUS
Status: DISCONTINUED | OUTPATIENT
Start: 2025-02-04 | End: 2025-02-04 | Stop reason: HOSPADM

## 2025-02-04 RX ORDER — ONDANSETRON 4 MG/1
4 TABLET, ORALLY DISINTEGRATING ORAL EVERY 6 HOURS PRN
Status: DISCONTINUED | OUTPATIENT
Start: 2025-02-04 | End: 2025-02-04 | Stop reason: HOSPADM

## 2025-02-04 RX ORDER — FENTANYL CITRATE 50 UG/ML
100 INJECTION, SOLUTION INTRAMUSCULAR; INTRAVENOUS
Status: DISCONTINUED | OUTPATIENT
Start: 2025-02-04 | End: 2025-02-04 | Stop reason: HOSPADM

## 2025-02-04 RX ORDER — KETOROLAC TROMETHAMINE 30 MG/ML
30 INJECTION, SOLUTION INTRAMUSCULAR; INTRAVENOUS
Status: DISCONTINUED | OUTPATIENT
Start: 2025-02-04 | End: 2025-02-06 | Stop reason: HOSPADM

## 2025-02-04 RX ORDER — OXYTOCIN/0.9 % SODIUM CHLORIDE 30/500 ML
340 PLASTIC BAG, INJECTION (ML) INTRAVENOUS CONTINUOUS PRN
Status: DISCONTINUED | OUTPATIENT
Start: 2025-02-04 | End: 2025-02-06 | Stop reason: HOSPADM

## 2025-02-04 RX ORDER — MISOPROSTOL 200 UG/1
400 TABLET ORAL
Status: DISCONTINUED | OUTPATIENT
Start: 2025-02-04 | End: 2025-02-04 | Stop reason: HOSPADM

## 2025-02-04 RX ORDER — FENTANYL/ROPIVACAINE/NS/PF 2MCG/ML-.1
PLASTIC BAG, INJECTION (ML) EPIDURAL
Status: DISCONTINUED | OUTPATIENT
Start: 2025-02-04 | End: 2025-02-04

## 2025-02-04 RX ORDER — LOPERAMIDE HYDROCHLORIDE 2 MG/1
4 CAPSULE ORAL
Status: DISCONTINUED | OUTPATIENT
Start: 2025-02-04 | End: 2025-02-04 | Stop reason: HOSPADM

## 2025-02-04 RX ORDER — ONDANSETRON 2 MG/ML
4 INJECTION INTRAMUSCULAR; INTRAVENOUS EVERY 6 HOURS PRN
Status: DISCONTINUED | OUTPATIENT
Start: 2025-02-04 | End: 2025-02-04 | Stop reason: HOSPADM

## 2025-02-04 RX ORDER — DOCUSATE SODIUM 100 MG/1
100 CAPSULE, LIQUID FILLED ORAL DAILY
Status: DISCONTINUED | OUTPATIENT
Start: 2025-02-05 | End: 2025-02-06 | Stop reason: HOSPADM

## 2025-02-04 RX ORDER — OXYTOCIN/0.9 % SODIUM CHLORIDE 30/500 ML
100-340 PLASTIC BAG, INJECTION (ML) INTRAVENOUS CONTINUOUS PRN
Status: DISCONTINUED | OUTPATIENT
Start: 2025-02-04 | End: 2025-02-06 | Stop reason: HOSPADM

## 2025-02-04 RX ORDER — BISACODYL 10 MG
10 SUPPOSITORY, RECTAL RECTAL DAILY PRN
Status: DISCONTINUED | OUTPATIENT
Start: 2025-02-04 | End: 2025-02-06 | Stop reason: HOSPADM

## 2025-02-04 RX ORDER — CITRIC ACID/SODIUM CITRATE 334-500MG
30 SOLUTION, ORAL ORAL
Status: DISCONTINUED | OUTPATIENT
Start: 2025-02-04 | End: 2025-02-04 | Stop reason: HOSPADM

## 2025-02-04 RX ORDER — OXYTOCIN/0.9 % SODIUM CHLORIDE 30/500 ML
340 PLASTIC BAG, INJECTION (ML) INTRAVENOUS CONTINUOUS PRN
Status: DISCONTINUED | OUTPATIENT
Start: 2025-02-04 | End: 2025-02-04 | Stop reason: HOSPADM

## 2025-02-04 RX ORDER — SODIUM CHLORIDE, SODIUM LACTATE, POTASSIUM CHLORIDE, CALCIUM CHLORIDE 600; 310; 30; 20 MG/100ML; MG/100ML; MG/100ML; MG/100ML
INJECTION, SOLUTION INTRAVENOUS CONTINUOUS PRN
Status: DISCONTINUED | OUTPATIENT
Start: 2025-02-04 | End: 2025-02-04

## 2025-02-04 RX ORDER — SODIUM CHLORIDE, SODIUM LACTATE, POTASSIUM CHLORIDE, CALCIUM CHLORIDE 600; 310; 30; 20 MG/100ML; MG/100ML; MG/100ML; MG/100ML
INJECTION, SOLUTION INTRAVENOUS CONTINUOUS
Status: DISCONTINUED | OUTPATIENT
Start: 2025-02-04 | End: 2025-02-04 | Stop reason: HOSPADM

## 2025-02-04 RX ORDER — LOPERAMIDE HYDROCHLORIDE 2 MG/1
2 CAPSULE ORAL
Status: DISCONTINUED | OUTPATIENT
Start: 2025-02-04 | End: 2025-02-04 | Stop reason: HOSPADM

## 2025-02-04 RX ORDER — METOCLOPRAMIDE HYDROCHLORIDE 5 MG/ML
10 INJECTION INTRAMUSCULAR; INTRAVENOUS EVERY 6 HOURS PRN
Status: DISCONTINUED | OUTPATIENT
Start: 2025-02-04 | End: 2025-02-04 | Stop reason: HOSPADM

## 2025-02-04 RX ORDER — MISOPROSTOL 200 UG/1
800 TABLET ORAL
Status: DISCONTINUED | OUTPATIENT
Start: 2025-02-04 | End: 2025-02-06 | Stop reason: HOSPADM

## 2025-02-04 RX ADMIN — Medication 2 MILLI-UNITS/MIN: at 20:25

## 2025-02-04 RX ADMIN — Medication: at 21:23

## 2025-02-04 RX ADMIN — Medication: at 12:56

## 2025-02-04 RX ADMIN — ONDANSETRON 4 MG: 2 INJECTION INTRAMUSCULAR; INTRAVENOUS at 20:49

## 2025-02-04 RX ADMIN — CEFAZOLIN SODIUM 2 G: 2 INJECTION, SOLUTION INTRAVENOUS at 22:17

## 2025-02-04 RX ADMIN — SODIUM CHLORIDE, POTASSIUM CHLORIDE, SODIUM LACTATE AND CALCIUM CHLORIDE: 600; 310; 30; 20 INJECTION, SOLUTION INTRAVENOUS at 13:17

## 2025-02-04 RX ADMIN — SODIUM CHLORIDE, POTASSIUM CHLORIDE, SODIUM LACTATE AND CALCIUM CHLORIDE 1000 ML: 600; 310; 30; 20 INJECTION, SOLUTION INTRAVENOUS at 12:11

## 2025-02-04 ASSESSMENT — ACTIVITIES OF DAILY LIVING (ADL)
ADLS_ACUITY_SCORE: 16
ADLS_ACUITY_SCORE: 41
ADLS_ACUITY_SCORE: 16
ADLS_ACUITY_SCORE: 41
ADLS_ACUITY_SCORE: 16

## 2025-02-04 NOTE — H&P
ADMIT NOTE  =================  41w2d  America Pastor is a 35 year old female     with an Patient's last menstrual period was 2024. and Estimated Date of Delivery: 2025 is [facility] the [facility] on 2025 at 11:47 AM in active labor.   Fetal movement- active  ROM- no   GBS- negative    HPI  ================  This CNM has been communicating with patient and partner since 0700. America started gian around 0600 this morning. Contractions have been mainly about 5 minutes apart with an occasional spacing out to q7-8 minutes. They have been getting more intense over the morning but they do vary in intensity. She was seen in clinic for a scheduled appointment and cervix was 4-5cm/80/+1. We discussed options and she was ready to be admitted and work toward getting an epidural.   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  Couple asking if they really needed to do it or if their  would be screened as part of the 24 hr universal screening.  Referred to their peds provider.       Family history of malignant neoplasm of breast 2024     Priority: Medium     TWO maternal aunts:       Pt hx of fibrocystic breasts.    Screening at 35 or between pregnancies.        Situational anxiety 10/30/2013     Priority: Medium     CARDIOVASCULAR SCREENING; LDL GOAL LESS THAN 160 2012     Priority: Medium        HISTORIES  ============  Allergies   Allergen Reactions    Seasonal Allergies Cough     Vomiting and headaches     Past Medical History:   Diagnosis Date    Fibrocystic breast     Seasonal allergic rhinitis      Past Surgical History:   Procedure Laterality Date    ORTHOPEDIC SURGERY Left     ACL age 18   .  Family History   Problem Relation Age of Onset    Fibrocystic breast disease Mother     C.A.D. Father     Heart Disease Father     No Known Problems Brother     Alzheimer Disease Maternal Grandmother     No Known Problems Maternal Grandfather     No Known Problems Paternal Grandmother     No Known Problems Paternal Grandfather     Breast Cancer Maternal Aunt 42    Autism Spectrum Disorder Maternal Aunt     Breast Cancer Maternal Aunt 65     Social History     Tobacco Use    Smoking status: Never    Smokeless tobacco: Never   Substance Use Topics    Alcohol use: Not Currently     Alcohol/week: 2.0 - 5.0 standard drinks of alcohol     Types: 2 - 5 Standard drinks or equivalent per week     OB History    Para Term  AB Living   1 0 0 0 0 0   SAB IAB Ectopic Multiple Live Births   0 0 0 0 0      # Outcome Date GA Lbr Arvind/2nd Weight Sex Type Anes PTL Lv   1 Current                 LABS:   ===========  Rhogam not indicated  Blood type A POS  Rubella NI  Syphilis NR  HIV NR    Lab Results   Component Value Date    HGB 13.4 2024    HGB 13.8 07/15/2013      Lab Results   Component Value Date    HEPBANG Nonreactive 2024     GBS negative    GTT All values WNL, (3hr was low at 58)    ROS  =========  Pt denies significant respiratory, cardiovacular, GI, or muscular/skeletalcomplaints.    See RN data base ROS.     PHYSICAL EXAM:  ===============  Last menstrual period 2024, not currently breastfeeding.  General appearance: uncomfortable with contractions  Heart: RRR without murmur  Lungs: clear to  auscultation   Neuro: denies headache and visual disturbances  Psych: Mentation normal and bright   Legs: 2+/2+, no clonus, no edema      Abdomen: gravid, single vertex fetus, non-tender between contractions.  EFW-  7.5 lbs.   CONTRACTIONS: Contractions every 3-8 minutes.  Palpate: moderate  FETAL HEART TONES: baseline 125 with moderate FHR variability and  pos accelerations. No decelerations present.      PELVIC EXAM:4-5/80/+1  NEAL SCORE: 11  BLOODY SHOW: yes since this morning   ROM: No    ASSESSMENT:  ==============  IUP @ 41w2d in active labor   NST REACTIVE  Fetal Heart rate tracing Category one  GBS- negative  Rubella NI     PLAN:  ===========  Admit - see IP orders  Obtain IV access and admission labs including CBC, T&S, syphilis  Pain medication - patient interested in epidural. Will notify anesthesia.  Anticipate    TAVO Sultana CNM

## 2025-02-04 NOTE — PROGRESS NOTES
"Late entry due to patient care    S:Patient comfortable with epidural. Called to room for prolonged decels that were resolved with position change when I got to room.     O:  Blood pressure 106/71, temperature 98.8  F (37.1  C), temperature source Oral, resp. rate 17, height 1.727 m (5' 8\"), weight 81.2 kg (179 lb), last menstrual period 2024, SpO2 97%, not currently breastfeeding.  General appearance: comfortable  CONTRACTIONS: Contractions every 6-9 minutes.  Palpate: moderate  FETAL HEART TONES: baseline 135 with moderate FHR variability and    accelerations yes. Decelerations yes - intermittent lates with 2 prolonged decels, now returned to baseline.    ROM: not ruptured  PELVIC EXAM:8100/+1  Fetal Position:  vertex  Bloody show: Yes   Pitocin- none,  Antibiotics- none    ASSESSMENT:  ==============  IUP @ 41w2d active labor, transition  Fetal Heart rate tracing category two, currently Cat I tracing  GBS- negative     PLAN:  ===========  Discussed AROM to facilitate closer contractions as patient is making good cervical change but still gian q7 minutes. Will wait for now since baby just had some decels but will consider at next exam.   Comfort measures prn - continue with position changes as tolerated by baby  Pain medication - comfortable with epidural  Anticipate   Reevaluate in 2-4 hours/PRN   Kirti Jung, TAVO, CNM   "

## 2025-02-04 NOTE — PROGRESS NOTES
41w2d  America is here with Dinesh for postdates testing visit. We have been communicating throughout the morning as she started having contractions earlier this morning. Baby has been active. No LOF, small but consistent amount of bloody show.   NST for post dates - baseline 125, moderate variability, +accels, -decels. Contractions q3-8 minutes, moderate intensity. NST reactive.   Cervical exam - 4-5cm/80/+1, bulging bag of water palpated.  America and Dinesh prefer to go to hospital at this time. She is planning an epidural for pain management but was hoping to get to 6cm prior to epidural placement.   Charge RN aware and instructions given to America and Dinesh to get to [facility]. Will meet them over there shortly. MML

## 2025-02-04 NOTE — ANESTHESIA PREPROCEDURE EVALUATION
Anesthesia Pre-Procedure Evaluation    Patient: America Pastor   MRN: [id number] : 1990        Procedure :           Past Medical History:   Diagnosis Date    Fibrocystic breast     Seasonal allergic rhinitis       Past Surgical History:   Procedure Laterality Date    ORTHOPEDIC SURGERY Left     ACL age 18      Allergies   Allergen Reactions    Seasonal Allergies Cough     Vomiting and headaches      Social History     Tobacco Use    Smoking status: Never    Smokeless tobacco: Never   Substance Use Topics    Alcohol use: Not Currently     Alcohol/week: 2.0 - 5.0 standard drinks of alcohol     Types: 2 - 5 Standard drinks or equivalent per week      Wt Readings from Last 1 Encounters:   25 81.2 kg (179 lb)        Anesthesia Evaluation   Pt has not had prior anesthetic         ROS/MED HX  ENT/Pulmonary:  - neg pulmonary ROS     Neurologic:  - neg neurologic ROS     Cardiovascular:  - neg cardiovascular ROS     METS/Exercise Tolerance:     Hematologic:  - neg hematologic  ROS     Musculoskeletal:       GI/Hepatic:  - neg GI/hepatic ROS     Renal/Genitourinary:       Endo:  - neg endo ROS     Psychiatric/Substance Use:  - neg psychiatric ROS     Infectious Disease:       Malignancy:       Other:            Physical Exam    Airway        Mallampati: II   TM distance: > 3 FB   Neck ROM: full   Mouth opening: > 3 cm    Respiratory Devices and Support         Dental  no notable dental history     (+) Minor Abnormalities - some fillings, tiny chips      Cardiovascular   cardiovascular exam normal          Pulmonary   pulmonary exam normal                OUTSIDE LABS:  CBC:   Lab Results   Component Value Date    WBC 14.2 (H) 2024    WBC 8.6 2024    HGB 13.4 2024    HGB 13.3 10/14/2024    HCT 41.3 2024    HCT 39.4 2024     2024     2024     BMP:   Lab Results   Component Value Date     2015     07/15/2013    POTASSIUM 4.0 2015     "POTASSIUM 4.1 07/15/2013    CHLORIDE 102 09/29/2015    CHLORIDE 104 07/15/2013    CO2 29 09/29/2015    CO2 24 07/15/2013    BUN 17 09/29/2015    BUN 13 07/15/2013    CR 0.75 09/29/2015    CR 0.64 07/15/2013    GLC 89 09/29/2015     (H) 07/15/2013     COAGS: No results found for: \"PTT\", \"INR\", \"FIBR\"  POC:   Lab Results   Component Value Date    HCG Negative 07/08/2011     HEPATIC:   Lab Results   Component Value Date    ALBUMIN 4.3 09/29/2015    PROTTOTAL 7.5 09/29/2015    ALT 29 09/29/2015    AST 10 09/29/2015    ALKPHOS 38 (L) 09/29/2015    BILITOTAL 1.1 09/29/2015     OTHER:   Lab Results   Component Value Date    TAVON 9.4 09/29/2015    TSH 0.56 07/15/2013       Anesthesia Plan    ASA Status:  2       Anesthesia Type: Epidural.              Consents    Anesthesia Plan(s) and associated risks, benefits, and realistic alternatives discussed. Questions answered and patient/representative(s) expressed understanding.     - Discussed:     - Discussed with:  Patient            Postoperative Care            Comments:           neg OB ROS.      Mc Forman MD    I have reviewed the pertinent notes and labs in the chart from the past 30 days and (re)examined the patient.  Any updates or changes from those notes are reflected in this note.    Clinically Significant Risk Factors Present on Admission                                          "

## 2025-02-04 NOTE — PROVIDER NOTIFICATION
02/04/25 1445   Provider Notification   Provider Name/Title BERTHA Jung   Method of Notification Phone   Request Evaluate in Person   Notification Reason Decels     Requested BERTHA Jung to come to bedside for recurrent prolonged decels.    Addendum: 2854 Decels resolved with position changes

## 2025-02-04 NOTE — PROGRESS NOTES
[facility] to be [facility] for labor. Rj Q 5 minutes. Patient breathing through contractions. No LOF. Some bloody show per patient. Endorses + FM. BERTHA Jung informed of patient arrival. EFM and [employer] [place] applied. IV placed and labs sent. Patient requesting epidural. Anesthesia aware and will start getting patient ready for placement.

## 2025-02-04 NOTE — ANESTHESIA PROCEDURE NOTES
"Dural puncture epidural Procedure Note    Pre-Procedure   Staff -        Anesthesiologist:  Bernarda Ray MD       Resident/Fellow: Mc Forman MD       Performed By: resident and with residents       Procedure performed by resident/fellow/CRNA in presence of a teaching physician.         Location: OB  Timeout:       Correct Patient: Yes        Correct Procedure: Yes        Correct Site: Yes        Correct Position: Yes   Procedure Documentation  Procedure: dural puncture epidural         Diagnosis: labor analgesia       Patient Position: sitting       Patient Prep/Sterile Barriers: sterile gloves, mask       Skin prep: Chloraprep       Local skin infiltrated with 3 mL of 2% lidocaine.        Insertion Site: L3-4. (midline approach).       Technique: LORT saline        GARRY at 4.5 cm.       Needle Type: [employer]       Needle Gauge: 17.        Needle Length (Inches): 3.5        Spinal Needle Type: Pencan       Spinal Needle (gauge): 25        Spinal Needle Length (inches): 4.69       Catheter: 19 G.          Catheter threaded easily.         4.5 cm epidural space.         Threaded 9 cm at skin.         # of attempts: 1 and  # of redirects:  2    Assessment/Narrative         Paresthesias: No.       Test dose of 3 mL lidocaine 1.5% w/ 1:200,000 epinephrine at 12:57 CST.         Test dose negative, 3 minutes after injection, for signs of intravascular, subdural, or intrathecal injection.       Insertion/Infusion Method: LORT saline       Aspiration negative for Heme or CSF via Epidural Catheter.       CSF fluid: with Spinal needle.CSF fluid removed: with Epidural needle - not with Epidural needle.      FOR [facility] ([facility]) ONLY:   Pain Team Contact information: please page the Pain Team Via X-1. Search \"Pain\". During daytime hours, please page the attending first. At night please page the resident first.      "

## 2025-02-04 NOTE — ANESTHESIA PROCEDURE NOTES
"Epidural catheter Procedure Note    Pre-Procedure   Staff -        Anesthesiologist:  Bernarda Ray MD       Resident/Fellow: Mc Forman MD       Performed By: resident       Location: OB       Pre-Anesthestic Checklist: patient identified, IV checked, risks and benefits discussed, informed consent, monitors and equipment checked, pre-op evaluation, at physician/surgeon's request and post-op pain management  Timeout:       Correct Patient: Yes        Correct Procedure: Yes        Correct Site: Yes        Correct Position: Yes   Procedure Documentation  Procedure: epidural catheter         Diagnosis: labor analgesia       Patient Position: sitting       Patient Prep/Sterile Barriers: sterile gloves, mask, patient draped       Skin prep: Betadine and Chloraprep       Local skin infiltrated with mL of 2% lidocaine.        Insertion Site: L3-4. (midline approach).       Technique: LORT saline        GARRY at 4.5 cm.       Needle Type: Navitay needle       Needle Gauge: 17.        Needle Length (Inches): 3.5        Catheter: 20 G.          Catheter threaded easily.         4.5 cm epidural space.         Threaded 9 cm at skin.         # of attempts: 1 and  # of redirects:  1    Assessment/Narrative         Paresthesias: No.       Test dose of 3 mL lidocaine 1.5% w/ 1:200,000 epinephrine at 12:57 CST.         Test dose negative, 3 minutes after injection, for signs of intravascular, subdural, or intrathecal injection.       Insertion/Infusion Method: LORT saline       Aspiration negative for Heme or CSF via Epidural Catheter.    Medication(s) Administered   0.125% bupivacaine (Epidural) - EPIDURAL   8 mL - 2/4/2025 1:02:00 PM    FOR [facility] ([facility]) ONLY:   Pain Team Contact information: please page the Pain Team Via Piqora. Search \"Pain\". During daytime hours, please page the attending first. At night please page the resident first.      "

## 2025-02-05 LAB — HGB BLD-MCNC: 11.7 G/DL (ref 11.7–15.7)

## 2025-02-05 PROCEDURE — 120N000002 HC R&B MED SURG/OB UMMC

## 2025-02-05 PROCEDURE — 36415 COLL VENOUS BLD VENIPUNCTURE: CPT | Performed by: ADVANCED PRACTICE MIDWIFE

## 2025-02-05 PROCEDURE — 250N000013 HC RX MED GY IP 250 OP 250 PS 637: Performed by: ADVANCED PRACTICE MIDWIFE

## 2025-02-05 PROCEDURE — 85018 HEMOGLOBIN: CPT | Performed by: ADVANCED PRACTICE MIDWIFE

## 2025-02-05 RX ORDER — AMOXICILLIN 250 MG
1 CAPSULE ORAL DAILY
Qty: 100 TABLET | Refills: 0 | Status: SHIPPED | OUTPATIENT
Start: 2025-02-05

## 2025-02-05 RX ORDER — IBUPROFEN 600 MG/1
600 TABLET, FILM COATED ORAL EVERY 6 HOURS PRN
Qty: 60 TABLET | Refills: 0 | Status: SHIPPED | OUTPATIENT
Start: 2025-02-05

## 2025-02-05 RX ORDER — ACETAMINOPHEN 325 MG/1
650 TABLET ORAL EVERY 6 HOURS PRN
Qty: 100 TABLET | Refills: 0 | Status: SHIPPED | OUTPATIENT
Start: 2025-02-05

## 2025-02-05 RX ADMIN — IBUPROFEN 800 MG: 800 TABLET ORAL at 03:16

## 2025-02-05 RX ADMIN — IBUPROFEN 800 MG: 800 TABLET ORAL at 09:23

## 2025-02-05 RX ADMIN — ACETAMINOPHEN 650 MG: 325 TABLET, FILM COATED ORAL at 09:23

## 2025-02-05 RX ADMIN — BENZOCAINE AND LEVOMENTHOL: 200; 5 SPRAY TOPICAL at 13:46

## 2025-02-05 RX ADMIN — ACETAMINOPHEN 650 MG: 325 TABLET, FILM COATED ORAL at 03:16

## 2025-02-05 RX ADMIN — DOCUSATE SODIUM 100 MG: 100 CAPSULE, LIQUID FILLED ORAL at 09:24

## 2025-02-05 RX ADMIN — IBUPROFEN 800 MG: 800 TABLET ORAL at 17:23

## 2025-02-05 RX ADMIN — ACETAMINOPHEN 650 MG: 325 TABLET, FILM COATED ORAL at 13:45

## 2025-02-05 RX ADMIN — ACETAMINOPHEN 650 MG: 325 TABLET, FILM COATED ORAL at 19:57

## 2025-02-05 RX ADMIN — BENZOCAINE AND LEVOMENTHOL: 200; 5 SPRAY TOPICAL at 09:27

## 2025-02-05 ASSESSMENT — ACTIVITIES OF DAILY LIVING (ADL)
ADLS_ACUITY_SCORE: 20
ADLS_ACUITY_SCORE: 19
ADLS_ACUITY_SCORE: 19
ADLS_ACUITY_SCORE: 20
ADLS_ACUITY_SCORE: 19
ADLS_ACUITY_SCORE: 20
ADLS_ACUITY_SCORE: 19
ADLS_ACUITY_SCORE: 20
ADLS_ACUITY_SCORE: 19
ADLS_ACUITY_SCORE: 20
ADLS_ACUITY_SCORE: 19
ADLS_ACUITY_SCORE: 20
ADLS_ACUITY_SCORE: 20
ADLS_ACUITY_SCORE: 19

## 2025-02-05 NOTE — CONSULTS
"OB Consult Note    Contacted by Kirti Islas CNM to assess America Pastor for cord avulsion s/p vaginal delivery.     S: she is comfortable with an epidural. Had no concerns.     O:  /60   Temp 98.3  F (36.8  C) (Oral)   Resp 16   Ht 1.727 m (5' 8\")   Wt 81.2 kg (179 lb)   LMP 2024   SpO2 100%   Breastfeeding Unknown   BMI 27.22 kg/m    Gen: Laying in bed with baby, no acute distress    A/P: 35 year old yo  at 41w2d who presented in active labor and had an uncomplicated vaginal delivery with the CNM. OBGYN was consulted due to cord avulsion during the 3rd stage of labor. Patient was comfortable with an epidural in place.     Procedure: Manual removal of placenta.   Discussed recommendation for a uterine sweep to delivery the placenta. She was in agreement. The bladder was drained. The uterine-placenta plane was found and the placenta delivered during the first pass. It appeared intact. A second uterine sweep was done with delivery of small amount membranes. Noted on uterine sweep was a submucosal posterior fibroid about a golf size. She tolerated the procedure well and received ancef for the sweep.     Recommendations: Follow up in 6 weeks for possible uterine ultrasound to evaluated for retained products especially if continued vaginal bleeding or signs of infections. .    Discussed with Dr. Roland Alvarado MD MPH  Obstetric & Gynecology, PGY-2  2025 , 6:03 AM         Physician Attestation   I was present for the entire procedure between opening and closing.    Odessa Watkins MD  Date of Service (when I saw the patient): 25    "

## 2025-02-05 NOTE — PLAN OF CARE
Goal Outcome Evaluation:    Plan of Care Reviewed With: patient    Overall Patient Progress: improving    VSS. Denies chest pain, SOB, nausea, dizziness. Up ad perla and voiding. PP assessments WDL. Perineal pain and discomfort managed with tylenol and ibuprofen. Also using tucks pads, ice packs and lisa bottle. Pt is breastfeeding with moderate assistance with latching and positioning. Lactation consult released. Great bonding observed with baby girl. Supported by  Dinesh. Anticipate discharge to home 1-2 days pending BF support.     Continue with postpartum cares and education per POC.     Problem: Adult Inpatient Plan of Care  Goal: Plan of Care Review  Outcome: Progressing  Outcome: Progressing  Goal: Absence of Hospital-Acquired Illness or Injury  Outcome: Progressing  Goal: Optimal Comfort and Wellbeing  Outcome: Progressing  Goal: Readiness for Transition of Care  Outcome: Progressing  Problem: Postpartum (Vaginal Delivery)  Goal: Successful Parent Role Transition  Outcome: Progressing  Goal: Hemostasis  Outcome: Progressing  Goal: Absence of Infection Signs and Symptoms  Outcome: Progressing  Goal: Anesthesia/Sedation Recovery  Outcome: Progressing  Goal: Optimal Pain Control and Function  Outcome: Progressing  Goal: Effective Urinary Elimination  Outcome: Progressing

## 2025-02-05 NOTE — PLAN OF CARE
Goal Outcome Evaluation:      Plan of Care Reviewed With: patient, spouse    Overall Patient Progress: improvingOverall Patient Progress: improving     Pt currently laboring down. Doing well breathing through contractions. Feeling vaginal and rectal pressure with contractions. VSS. Afebrile. FHT's 150 with moderate variability, accels and intermittent variable decels. Contractions Q 2-3 minutes palpating strong. Anticipate .

## 2025-02-05 NOTE — LACTATION NOTE
This note was copied from a baby's chart.  Consult for:  first time breastfeeding     Infant Name: Isis or Shirin (or Isis June)    Infant's Primary Care Clinic: [facility]    Delivery Information:  Baby girl was born at Gestational Age: 41w2d via vaginal delivery on 2/4/2025 9:43 PM     Maternal Health History:    Information for the patient's mother:  America Pastor [[id number]]     Past Medical History:   Diagnosis Date    Fibrocystic breast     Seasonal allergic rhinitis     and   Information for the patient's mother:  America Pastor [[id number]]     Medications Prior to Admission   Medication Sig Dispense Refill Last Dose/Taking    [DISCONTINUED] Prenatal Vit-DSS-Fe Cbn-FA (PRENATAL AD PO)    Past Week         Maternal Breast Exam:  America noted breast growth and sensitivity in early pregnancy. She denies any history of breast/chest injury or surgery. Her breasts are soft and symmetrical with bilateral intact, everted nipples. She has been able to hand express colostrum. ?    Breastfeeding/ Lactation History: first time breastfeeding    Infant information: Baby was LGA at birth and has age appropriate output and weight loss.      Weight Change Since Birth: <24 hours old at time of consult    Oral exam of baby:  Baby has normal jaw, normal palate, good length of tongue beyond lingual frenulum attachment, extension well beyond gum ridge & organized when sucking on finger.     Feeding History: Some breastfeeding attempts were made with fair results, but baby has been sleepy and reluctant to suck.    Feeding Assessment:  Baby was brought to the right breast in a football hold after having her suck on a gloved finger to get her in a good rhythmic suck pattern.  Despite numerous attempts and after hand expressing drops of colostrum to the nipple, she was not actively interested in latching.  She was moved to cradle/cross-cradle on the left breast and was able to make a few more latching  attempts, but she mostly held the nipple in her mouth and slept.      Education:   [x] Expected  feeding patterns in the first few days (pg. 38 of Your Guide to To Postpartum and [place] Care)/ the Second Night  [x] Stages of milk production  [x] Benefits of hand expression of colostrum  [x] Early feeding cues     [x] Benefits of feeding on cue  [x] Benefits of skin to skin  [x] Breastfeeding positions  [x] Tips to get and maintain a deep latch  [] Nutritive vs.non-nutritive sucking  [] Gentle breast compressions as needed to enhance milk transfer  [] How to tell when baby is finished  [] How to tell if baby is getting enough  [x] Expected  output  [x]  weight loss  [x] Infant Feeding Log  [x] [employer] Breastfeeding/Pumping videos  [x] Signs breastfeeding is going well (comfortable latch, audible swallows, age appropriate output and weight loss)    [] Tips to prevent engorgement  [] Signs of engorgement  [] Tips to manage engorgement  [] Pumping recommendations (based on patient need)  [] [facility] breast pump part/infant feeding supplies cleaning recommendations  [x] Inpatient breastfeeding support  [x] Outpatient lactation resources    Baby is still very sleepy and not interested in latching so this attempted feeding was difficult.  Discussed hand expression into a spoon and encouraged parents to watch the hand expression video.      Handouts: Infant Feeding Log (Week 1, Your Guide to Postpartum &  Care Book)    Home Breast Pump: not discussed    Plan: Continue breastfeeding on cue with RN support as needed, goal of 8-12 feedings per day.     Encourage frequent skin to skin and hand expression.     Encouraged follow up with outpatient lactation consultant  as needed after discharge. Family plans to follow up with [facility].  They are aware of [facility] lactation services and how to schedule.      Wendy Srinivasan, RN, IBCLC   Lactation Consultant  Vocera: Lactation  Specialist [facility] [phone]  Office: [phone]

## 2025-02-05 NOTE — PROGRESS NOTES
"  S:Patient mostly comfortable with epidural but does endorse significant pelvic pressure with contractions.     O:  Blood pressure 98/51, temperature 98.8  F (37.1  C), temperature source Oral, resp. rate 18, height 1.727 m (5' 8\"), weight 81.2 kg (179 lb), last menstrual period 04/21/2024, SpO2 97%, not currently breastfeeding.  General appearance: comfortable    CONTRACTIONS: Contractions every 2-4 minutes.  Palpate: strong  FETAL HEART TONES: baseline 150 with moderate FHR variability and    accelerations yes. Decelerations yes - another prolonged decel.    ROM: not ruptured  PELVIC EXAM:C/C/+1, bulging bag of water  Fetal Position:  vertex  Bloody show: Yes   Pitocin- none,  Antibiotics- none    ASSESSMENT:  ==============  IUP @ 41w2d active labor, transition  Fetal Heart rate tracing category two, currently Cat I tracing  GBS- negative    PLAN:  ===========  Comfort measures prn   Pain medication - comfortable with epidural  Discussed options - patient would like to wait for an hour before starting to push. Is leaning towards AROM prior to pushing. Will discuss further at one hour or sooner prn   TAVO Sultana, BERTHA   "

## 2025-02-05 NOTE — PLAN OF CARE
Patient arrived to [facility] unit via wheelchair at 0115 ,with belongings, accompanied by spouse/ significant other, with infant in arms. Received report from  Chioma WU  and checked bands. Unit and room orientation  completed . Call light given and within arms reach; no concerns present at this time. Continue with plan of care.

## 2025-02-05 NOTE — L&D DELIVERY NOTE
Delivery Summary  Patient presented to clinic for scheduled appointment and was uncomfortable with contractions. Cervix 4-5cm and [facility]. She received an epidural for pain management and made good steady progress. Decision was made to augment labor with pitocin after pushing for one hour with minimal progress, likely due to contractions remaining q4 minutes. Pushed well over 2.5 hours to the birth of a vigorous baby girl who was placed immediately skin to skin with patient. Baby delivered in EMILE position with compound posterior arm, cord around the leg and a true knot in cord. At just before 30 minutes post delivery, applied moderate traction to cord to assess if placenta was ready and cord avulsed. Dr Watkins and Dr Alvarado called to room and performed manual removal of placenta. See note for details. Perineum inspected and shallow second degree tear noted and repaired in usual fashion using 3-0 Vicryl. A right periurethral laceration was noted but was hemostatic and well approximated when left alone so did not repair. QBL in blue drape at conclusion of repair was 100ml. Ancef 2g x1 IV was ordered for infection prophylaxis after manual removal of placenta. Of note, a fibroid was noted on exam.     IUP at 41 weeks 2 days gestation delivered on 2025 @ 2143.     delivery of a viable female infant; weight pending at time of note.  Apgars of 8 at 1 minute and 9 at 5 minutes.  Labor was augmented during second stage.  Medications administered  in labor:  Pain Rx Epidural; Antibiotics Yes: Ancef 2g x1   Perineum: 2nd degree  Placenta-mechanism: by manual extraction intact,  with a 3 vessel cord. IV oxytocin was given.  QBL in drape 100ml  Complications of pregnancy, labor and delivery: Avulsed cord  Birth attendants: BERTHA So MRN# [id number]   Age: 35 year old YOB: 1990     ASSESSMENT & PLAN: Routine PP quincy Pastor  Female-America [[id number]]      Labor Event Times      Latent labor onset date/time: 2025 0324    Active labor onset date: 25 Onset time:  3:12 PM   Dilation complete date: 25 Complete time:  5:51 PM   Start pushing date/time: 2025 1919          Labor Length      1st Stage (hrs): 2 (min): 39   2nd Stage (hrs): 3 (min): 52   3rd Stage (hrs): 0 (min): 31          Labor Events     labor?: No   steroids: None  Labor Type: Spontaneous  Predominate monitoring during 1st stage: continuous electronic fetal monitoring     Antibiotics received during labor?: No     Rupture identifier: Sac 1  Rupture date/time: 25 1830   Rupture type: Spontaneous Rupture of Membranes  Fluid color: Pink  Fluid odor: Normal     Augmentation: Oxytocin  Indications for augmentation: Ineffective Contraction Pattern       Delivery/Placenta Date and Time      Delivery Date: 25 Delivery Time:  9:43 PM   Placenta Date/Time: 2025 10:14 PM  Oxytocin given at the time of delivery: after delivery of baby  Delivering clinician: Kirti Jung APRN CNMAGDALENE   Other personnel present at delivery:  Provider Role   Chioma Alonso RN Delivery Nurse   Sandifer, Jaylynn A, RN Registered Nurse   Radha Kaye RN Registered Nurse             Vaginal Counts       Initial count performed by 2 team members:  Two Team Members   Erich Le         Needles Suture Needles Sponges (RETIRED) Instruments   Initial counts 2 0 5    Added to count 0 1 0    Relief counts       Final counts 2 1 5            Placed during labor Accounted for at the end of labor   FSE NA NA   IUPC NA NA   Cervidil NA NA                  Final count performed by 2 team members:  Two Team Members   MAGDALENE Jung A      Final count correct?: Yes  Post-Birth Team Debrief: Complete       Apgars    Living status: Living   1 Minute 5 Minute 10 Minute 15 Minute 20 Minute   Skin color: 1  1       Heart rate: 2  2       Reflex  irritability: 2  2       Muscle tone: 1  2       Respiratory effort: 2  2       Total: 8  9       Apgars assigned by: SUDHA KAYE RN       Cord      Vessels: 3 Vessels    Cord Complications: Knot               Cord Blood Disposition: Discard    Gases Sent?: No    Delayed cord clamping?: Yes    Cord Clamping Delay (seconds): >120 seconds    Stem cell collection?: No           [place] Resuscitation    Methods: None  [place] Care at Delivery: Female infant born with spontaneous cry, delayed cord clamping. Stimulated, dried, and placed on mothers chest, warm blankets and hat applied.       Labor Events and Shoulder Dystocia    Fetal Tracing Prior to Delivery: Category 2  Shoulder dystocia present?: Neg       Delivery (Maternal) (Provider to Complete) ([id number])    Episiotomy: None  Perineal lacerations: 2nd Repaired?: Yes     Periurethral laceration: right Repaired?: No   Repair suture: 3-0 Vicryl  Genital tract inspection done: Pos       Blood Loss  Mother: America Pastor #[id number]     Start of Mother's Information      Delivery Blood Loss   Intrapartum & Postpartum: 25 1512 - 25 2300    Delivery Admission: 25 1136 - 25 2300         Intrapartum & Postpartum Delivery Admission    None                  End of Mother's Information  Mother: America Pastor #[id number]                Delivery - Provider to Complete ([id number])    Delivering clinician: Kirti Jung APRN CNM  Delivery Type (Choose the 1 that will go to the Birth History): Vaginal, Spontaneous                         Other personnel:  Provider Role   Chioma Alonso RN Delivery Nurse   Sandifer, Jaylynn A, RN Registered Nurse   Radha Kaye RN Registered Nurse                    Placenta    Date/Time: 2025 10:14 PM  Removal: Manual Removal  Comments: avulsed cord; manual removal by OB/GYN team  Disposition: Hospital disposal             Anesthesia    Method: Epidural  Cervical dilation at placement: 4-7     Analgesic:   BIRTH HISTORY: ANALGESIC   LIDOCAINE 1 % INJECTION                 Presentation and Position    Presentation: Vertex    Position: Left Occiput Anterior                     TAVO Sultana CNM

## 2025-02-05 NOTE — PLAN OF CARE
Provider notification:   Provider: Kirti Jung CNM was notified at  regarding: continuation of second stage of labor.      Second Stage of Labor Algorithm    Start of Second Stage:  Length of active pushin  Pitocin: Currently running at 2 milliunits/hr.  Last increased at .     Passenger:  See flowsheets for fetal heart rate tracing data.   EFM interpretation suggests: concern for persistent Category II tracing due to: minimal variability.  EFM suggests concern for interruption of the oxygen pathway due to:  variable decelerations. Intermittent minimal, mostly moderate variability and intermittent variable decelerations.     Position  Fetal station: +1    Power  See flowsheets for uterine activity data.  Contraction frequency: every 3-4 minutes.   Contraction strength: strong by palpitation.    Maternal pushing effort: pushing effectively  Pushing Position: semi-Fowlers  Maternal current position change frequency: every 15 minutes    Psyche  Epidural / ITN: Yes Where is the patient located?  Maternal pain management: coping  Urge to push: present      Plan  After discussion with provider:  Interventions to optimize second stage:  active pushing  Plan per provider / orders received for pitocin  Next notification: Will notify in an hour for progress update..

## 2025-02-05 NOTE — PROVIDER NOTIFICATION
02/04/25 1835   Provider Notification   Provider Name/Title BERTHA Jnug   Method of Notification In Department   Notification Reason Membrane Status     SROM at 1830 with clear/pink tinged fluid. BERTHA Jung informed.

## 2025-02-05 NOTE — PROGRESS NOTES
"  S:Patient has been pushing now for about an hour. Baby is a bit lower but not significantly.     O:  Blood pressure 98/51, temperature 98.2  F (36.8  C), temperature source Oral, resp. rate 16, height 1.727 m (5' 8\"), weight 81.2 kg (179 lb), last menstrual period 2024, SpO2 97%, not currently breastfeeding.  General appearance: uncomfortable with contractions    CONTRACTIONS: Contractions every 2-5 minutes.  Palpate: strong  FETAL HEART TONES: baseline 150 with moderate FHR variability and    accelerations yes. Decelerations yes - variables.    ROM: clear fluid  PELVIC EXAM:C/C/0 to +1  Fetal Position:  asynclitic  Bloody show: No  Pitocin- none,  Antibiotics- none    ASSESSMENT:  ==============  IUP @ 41w2d second stage labor   Fetal Heart rate tracing category two due to intermittent variables  GBS- negative     PLAN:  ===========  Comfort measures prn - continue with position changes to facilitate fetal descent  Discussed pitocin augmentation - patient agrees to this. Order in.   Anticipate MAGDALENA Jung, TAVO, CNM   "

## 2025-02-05 NOTE — PROGRESS NOTES
Postpartum Day #1 Progress Note    Subjective:  America is postpartum day 1 following a spontaneous vaginal birth of a baby girl at 41w2d. Delivery c/b manual removal of placenta due to avulsed cord. She was given Ancef 2 g x 1 dose. She had a 2nd degree perineal laceration which was repaired. Epidural for pain management.  America is feeling well this morning. Currently sitting up in bed with infant and her  Dinesh at bedside. She reports more perineal soreness and also some soreness in her abdomen. Due for Motrin and Tylenol.  Describes bleeding as similar to a heavy period.  She is breastfeeding with assistance. Reports baby has been sleepy and not very interested in latching.  She is voiding without difficulty and ambulating without dizziness or lightheadedness.  She is unsure of postpartum contraception plans, but declines need for anything prior to leaving the hospital.  Has been normotensive throughout her hospital course.    Objective:  Patient Vitals for the past 24 hrs:   BP Temp Temp src Resp SpO2 Height Weight   02/05/25 0540 102/60 98  F (36.7  C) Oral 16 100 % -- --   02/05/25 0125 110/63 98.3  F (36.8  C) Oral 16 98 % -- --   02/05/25 0120 110/63 -- -- -- 98 % -- --   02/05/25 0045 -- -- -- -- 98 % -- --   02/05/25 0040 -- -- -- -- 99 % -- --   02/05/25 0035 -- -- -- -- 100 % -- --   02/05/25 0030 109/60 -- -- -- 98 % -- --   02/05/25 0025 -- -- -- -- 98 % -- --   02/05/25 0020 -- -- -- -- 99 % -- --   02/05/25 0015 -- -- -- -- 98 % -- --   02/05/25 0008 -- -- -- -- 98 % -- --   02/05/25 0003 -- -- -- -- 97 % -- --   02/05/25 0001 95/51 -- -- -- -- -- --   02/05/25 0000 -- 98.2  F (36.8  C) Oral 16 -- -- --   02/04/25 2358 -- -- -- -- 98 % -- --   02/04/25 2353 -- -- -- -- 98 % -- --   02/04/25 2348 98/57 -- -- -- 98 % -- --   02/04/25 2343 -- -- -- -- 98 % -- --   02/04/25 2338 -- -- -- -- 98 % -- --   02/04/25 2333 -- -- -- -- 97 % -- --   02/04/25 2331 103/55 -- -- -- -- -- --   02/04/25 2328  -- -- -- -- 98 % -- --   02/04/25 2323 -- -- -- -- 98 % -- --   02/04/25 2318 -- -- -- -- 99 % -- --   02/04/25 2316 106/55 -- -- -- -- -- --   02/04/25 2313 -- -- -- -- 98 % -- --   02/04/25 2308 -- -- -- -- 97 % -- --   02/04/25 2303 -- -- -- -- 98 % -- --   02/04/25 2301 113/61 -- -- -- -- -- --   02/04/25 2258 -- -- -- -- 98 % -- --   02/04/25 2253 -- -- -- -- 98 % -- --   02/04/25 2248 -- -- -- -- 98 % -- --   02/04/25 2246 105/57 -- -- -- -- -- --   02/04/25 2243 -- -- -- -- 98 % -- --   02/04/25 2238 -- -- -- -- 97 % -- --   02/04/25 2233 -- -- -- -- 97 % -- --   02/04/25 2231 102/54 -- -- -- -- -- --   02/04/25 2228 -- -- -- -- 97 % -- --   02/04/25 2223 -- -- -- -- 97 % -- --   02/04/25 2218 -- -- -- -- 97 % -- --   02/04/25 2217 106/59 -- -- -- -- -- --   02/04/25 2213 -- -- -- -- 99 % -- --   02/04/25 2208 -- -- -- -- 97 % -- --   02/04/25 2203 -- -- -- -- 98 % -- --   02/04/25 2158 -- -- -- -- 98 % -- --   02/04/25 2153 -- -- -- -- 98 % -- --   02/04/25 2148 -- -- -- -- 99 % -- --   02/04/25 2143 -- -- -- -- 98 % -- --   02/04/25 2138 -- -- -- -- 99 % -- --   02/04/25 2133 -- -- -- -- 99 % -- --   02/04/25 2128 -- -- -- -- 99 % -- --   02/04/25 2123 -- -- -- -- 98 % -- --   02/04/25 2118 -- -- -- -- 99 % -- --   02/04/25 2113 -- -- -- -- 99 % -- --   02/04/25 2108 -- 98.3  F (36.8  C) Oral -- 99 % -- --   02/04/25 2103 123/70 -- -- -- 98 % -- --   02/04/25 2058 -- -- -- -- 99 % -- --   02/04/25 2053 -- -- -- -- 99 % -- --   02/04/25 2048 -- -- -- -- 99 % -- --   02/04/25 2043 -- -- -- -- 99 % -- --   02/04/25 2038 -- -- -- -- 99 % -- --   02/04/25 2033 -- -- -- -- 99 % -- --   02/04/25 2028 -- -- -- -- 97 % -- --   02/04/25 2027 -- -- -- -- 94 % -- --   02/04/25 2023 -- -- -- -- 96 % -- --   02/04/25 2018 -- -- -- -- 96 % -- --   02/04/25 2017 -- -- -- -- 94 % -- --   02/04/25 2013 -- -- -- -- 98 % -- --   02/04/25 2008 -- -- -- -- 98 % -- --   02/04/25 2003 -- -- -- -- 98 % -- --   02/04/25 1958 -- --  -- -- 95 % -- --   02/04/25 1953 -- 98.2  F (36.8  C) Oral 16 97 % -- --   02/04/25 1948 -- -- -- -- 97 % -- --   02/04/25 1943 -- -- -- -- 98 % -- --   02/04/25 1938 -- -- -- -- 97 % -- --   02/04/25 1933 -- -- -- -- 97 % -- --   02/04/25 1928 -- -- -- -- 97 % -- --   02/04/25 1923 -- -- -- -- 96 % -- --   02/04/25 1918 -- -- -- -- 97 % -- --   02/04/25 1913 -- -- -- -- 96 % -- --   02/04/25 1908 -- -- -- -- 97 % -- --   02/04/25 1903 -- -- -- -- 96 % -- --   02/04/25 1851 -- 98.7  F (37.1  C) Oral -- -- -- --   02/04/25 1823 -- -- -- -- 97 % -- --   02/04/25 1822 98/51 -- -- 18 -- -- --   02/04/25 1523 -- -- -- -- 97 % -- --   02/04/25 1520 -- 98.8  F (37.1  C) Oral -- 97 % -- --   02/04/25 1518 -- -- -- -- 97 % -- --   02/04/25 1513 -- -- -- -- 97 % -- --   02/04/25 1511 106/71 -- -- 17 -- -- --   02/04/25 1508 -- -- -- -- 97 % -- --   02/04/25 1503 -- -- -- -- 96 % -- --   02/04/25 1458 -- -- -- -- 96 % -- --   02/04/25 1453 -- -- -- -- 96 % -- --   02/04/25 1452 109/63 -- -- 16 -- -- --   02/04/25 1448 -- -- -- -- 97 % -- --   02/04/25 1443 -- -- -- -- 98 % -- --   02/04/25 1438 -- -- -- -- 97 % -- --   02/04/25 1433 -- -- -- -- 97 % -- --   02/04/25 1428 -- -- -- -- 96 % -- --   02/04/25 1423 -- -- -- -- 96 % -- --   02/04/25 1418 -- -- -- -- 96 % -- --   02/04/25 1413 -- -- -- -- 96 % -- --   02/04/25 1410 108/66 -- -- -- -- -- --   02/04/25 1408 -- -- -- -- 96 % -- --   02/04/25 1405 108/67 -- -- 14 -- -- --   02/04/25 1403 -- -- -- -- 96 % -- --   02/04/25 1400 106/65 -- -- -- -- -- --   02/04/25 1358 -- -- -- -- 96 % -- --   02/04/25 1357 99/59 -- -- -- -- -- --   02/04/25 1353 107/65 -- -- 16 98 % -- --   02/04/25 1348 -- -- -- -- 98 % -- --   02/04/25 1346 109/57 -- -- -- -- -- --   02/04/25 1343 -- -- -- -- 97 % -- --   02/04/25 1341 111/59 -- -- -- -- -- --   02/04/25 1338 -- -- -- -- 99 % -- --   02/04/25 1335 106/66 -- -- 16 -- -- --   02/04/25 1333 -- -- -- -- 99 % -- --   02/04/25 1331 109/71 -- --  "-- -- -- --   02/04/25 1328 -- -- -- -- 99 % -- --   02/04/25 1325 110/63 -- -- 16 -- -- --   02/04/25 1323 -- -- -- -- 99 % -- --   02/04/25 1321 112/64 -- -- -- -- -- --   02/04/25 1318 -- -- -- -- 99 % -- --   02/04/25 1315 114/58 -- -- 17 -- -- --   02/04/25 1313 113/58 -- -- -- 100 % -- --   02/04/25 1311 115/60 -- -- 18 -- -- --   02/04/25 1309 115/66 -- -- -- -- -- --   02/04/25 1308 -- -- -- -- 100 % -- --   02/04/25 1307 114/62 -- -- 18 -- -- --   02/04/25 1306 -- -- -- -- -- 1.727 m (5' 8\") 81.2 kg (179 lb)   02/04/25 1305 111/64 -- -- -- -- -- --   02/04/25 1303 120/65 -- -- 17 100 % -- --   02/04/25 1258 -- -- -- -- 100 % -- --   02/04/25 1253 -- -- -- -- 100 % -- --   02/04/25 1248 -- -- -- -- 100 % -- --   02/04/25 1247 118/76 -- -- 16 -- -- --   02/04/25 1243 -- -- -- -- 97 % -- --   02/04/25 1151 122/72 97.7  F (36.5  C) Oral 18 -- -- --        Hemoglobin   Date Value Ref Range Status   02/05/2025 11.7 11.7 - 15.7 g/dL Final   02/04/2025 14.0 11.7 - 15.7 g/dL Final   07/15/2013 13.8 11.7 - 15.7 g/dL Final      Physical Exam:  Gen: Alert, no acute distress  Heart: Regular rate  Lungs: Non-labored respirations on room air  Abdomen: Soft, non tender, non distended. Fundus firm below umbilicus.  Extremities: Minimal edema bilaterally.     Assessment/Plan:  1. Postpartum day 1  - Continue current management  - Anticipate discharge to home tomorrow morning. Medications sent to pharmacy  - Discharge teaching completed. Reviewed warning signs, including signs of postpartum preeclampsia, hemorrhage, infection, and postpartum depression, and how to notify provider  - Plan postpartum follow up in 6 weeks or sooner as needed  - Lactation to see patient today. Reviewed outpatient lactation resources    TAVO Friedman CNM     "

## 2025-02-05 NOTE — PROVIDER NOTIFICATION
02/04/25 2202   Provider Notification   Provider Name/Title    Method of Notification Phone   Request Evaluate in Person   Notification Reason Other (Comment)  (BERTHA Cotto requested  bedisddali for patient retained placenta)     RN notified  via APR phone call per BERTHA Cotto request - regarding patient retained placenta.  and  arrived to patient bedside at 2204. Manual sweep of patient completed by  at 2210. Cefazolin administered to patient at 2217 per  verbal request - SEE MAR for details.

## 2025-02-05 NOTE — PLAN OF CARE
of viable Female with Kirti Jung CNM in attandance.  Patient spouse present. Infant with spontaneous cry, to mothers abdomen, dried and stimulated.  Apgars 8/9. Placenta manually removal by  and  bedside - avulsed cord, cord around baby leg during delivery and a true knot in cord, Pitocin bolused, 2nd degree laceration with repair, right periurethral laceration not repaired, lisa cares provided. Ancef 2g administered x1 to patient post manual sweep - SEE MAR for more details.  mL. Mother and baby in stable condition.

## 2025-02-05 NOTE — PLAN OF CARE
Data: America Pastor transferred to [facility] room 7142 via wheelchair at 0115. Baby transferred via parent's arms.  Action: Receiving unit notified of transfer: Yes. Patient and family notified of room change. Report given to JAZMIN Giron at 0832. Belongings sent to receiving unit. Accompanied by Registered Nurse. Oriented patient to surroundings. Call light within reach. ID bands double-checked with receiving RN.  Response: Patient tolerated transfer and is stable.

## 2025-02-06 VITALS
DIASTOLIC BLOOD PRESSURE: 79 MMHG | BODY MASS INDEX: 25.01 KG/M2 | RESPIRATION RATE: 16 BRPM | OXYGEN SATURATION: 100 % | WEIGHT: 165 LBS | HEIGHT: 68 IN | SYSTOLIC BLOOD PRESSURE: 115 MMHG | HEART RATE: 72 BPM | TEMPERATURE: 98.4 F

## 2025-02-06 PROCEDURE — 250N000013 HC RX MED GY IP 250 OP 250 PS 637: Performed by: ADVANCED PRACTICE MIDWIFE

## 2025-02-06 RX ADMIN — ACETAMINOPHEN 650 MG: 325 TABLET, FILM COATED ORAL at 08:40

## 2025-02-06 RX ADMIN — ACETAMINOPHEN 650 MG: 325 TABLET, FILM COATED ORAL at 02:33

## 2025-02-06 RX ADMIN — IBUPROFEN 800 MG: 800 TABLET ORAL at 08:41

## 2025-02-06 RX ADMIN — IBUPROFEN 800 MG: 800 TABLET ORAL at 00:38

## 2025-02-06 RX ADMIN — DOCUSATE SODIUM 100 MG: 100 CAPSULE, LIQUID FILLED ORAL at 08:41

## 2025-02-06 RX ADMIN — IBUPROFEN 800 MG: 800 TABLET ORAL at 14:22

## 2025-02-06 RX ADMIN — ACETAMINOPHEN 650 MG: 325 TABLET, FILM COATED ORAL at 12:59

## 2025-02-06 RX ADMIN — BENZOCAINE AND LEVOMENTHOL: 200; 5 SPRAY TOPICAL at 08:42

## 2025-02-06 ASSESSMENT — ACTIVITIES OF DAILY LIVING (ADL)
ADLS_ACUITY_SCORE: 19
ADLS_ACUITY_SCORE: 20
ADLS_ACUITY_SCORE: 20
ADLS_ACUITY_SCORE: 19
ADLS_ACUITY_SCORE: 20
ADLS_ACUITY_SCORE: 19
ADLS_ACUITY_SCORE: 20
ADLS_ACUITY_SCORE: 20
ADLS_ACUITY_SCORE: 19
ADLS_ACUITY_SCORE: 19
ADLS_ACUITY_SCORE: 20
ADLS_ACUITY_SCORE: 20

## 2025-02-06 NOTE — PLAN OF CARE
Goal Outcome Evaluation:  Patient is stable, her pain is well managed with motrin and tylenol. She walks well in the room, shower and breastfeed/hand express independently. Discharge today and discharge education and instructions were reviewed and verbalized understanding.

## 2025-02-06 NOTE — DISCHARGE INSTRUCTIONS
Warning Signs after Having a Baby    Keep this paper on your fridge or somewhere else where you can see it.    Call your provider if you have any of these symptoms up to 12 weeks after having your baby.    Thoughts of hurting yourself or your baby  Pain in your chest or trouble breathing  Severe headache not helped by pain medicine  Eyesight concerns (blurry vision, seeing spots or flashes of light, other changes to eyesight)  Fainting, shaking or other signs of a seizure    Call 9-1-1 if you feel that it is an emergency.     The symptoms below can happen to anyone after giving birth. They can be very serious. Call your provider if you have any of these warning signs.    My provider s phone number: _______________________    Losing too much blood (hemorrhage)    Call your provider if you soak through a pad in less than an hour or pass blood clots bigger than a golf ball. These may be signs that you are bleeding too much.    Blood clots in the legs or lungs    After you give birth, your body naturally clots its blood to help prevent blood loss. Sometimes this increased clotting can happen in other areas of the body, like the legs or lungs. This can block your blood flow and be very dangerous.     Call your provider if you:  Have a red, swollen spot on the back of your leg that is warm or painful when you touch it.   Are coughing up blood.     Infection    Call your provider if you have any of these symptoms:  Fever of 100.4 F (38 C) or higher.  Pain or redness around your stitches if you had an incision.   Any yellow, white, or green fluid coming from places where you had stitches or surgery.    Mood Problems (postpartum depression)    Many people feel sad or have mood changes after having a baby. But for some people, these mood swings are worse.     Call your provider right away if you feel so anxious or nervous that you can't care for yourself or your baby.    Preeclampsia (high blood pressure)    Even if you  didn't have high blood pressure when you were pregnant, you are at risk for the high blood pressure disease called preeclampsia. This risk can last up to 12 weeks after giving birth.     Call your provider if you have:   Pain on your right side under your rib cage  Sudden swelling in the hands and face    Remember: You know your body. If something doesn't feel right, get medical help.     For informational purposes only. Not to replace the advice of your health care provider. Copyright 2020 [facility]. All rights reserved. Clinically reviewed by Jacqueline Mccarthy, RNC-OB, MSN. [employer] [id number] - Rev 02/23.

## 2025-02-06 NOTE — LACTATION NOTE
This note was copied from a baby's chart.  Follow Up Consult    Infant Name: Leonel    Infant's Primary Care Clinic: [facility]    Maternal Assessment: No concerns.  Hand expressing is getting easier.       Infant Assessment:  Leonel has age appropriate weight loss.  She hasn't had a wet diaper since early afternoon yesterday.    Weight Change Since Birth: -5% at 2 day old      Feeding History: Leonel has been very sleepy.  She has shown that she can latch, but she falls asleep quickly and needs a great deal of stimulation to keep her going.  She has been given hand expressed colostrum on a spoon multiple times.      Feeding Assessment: Feeding attempted in cross-cradle position on both breasts.  On the right breast, she opened her mouth and held the nipple in her mouth.  When moved to the left breast, she made some attempts at latching and sucked for maybe 5 tugs, but then either let go or held the nipple in her mouth as she slept.    America did some hand expression, but then moved to pumping to get more stimulation.     Education:   [x] Expected  feeding patterns in the first few days (pg. 38 of Your Guide to To Postpartum and  Care)/ the Second Night  [x] Stages of milk production  [x] Benefits of hand expression of colostrum  [x] Early feeding cues     [x] Benefits of feeding on cue  [x] Benefits of skin to skin  [x] Breastfeeding positions  [x] Tips to get and maintain a deep latch  [] Nutritive vs.non-nutritive sucking  [x] Gentle breast compressions as needed to enhance milk transfer  [] How to tell when baby is finished  [x] How to tell if baby is getting enough  [x] Expected  output  [x] [place] weight loss  [x] Infant Feeding Log  [] [facility] [employer] [facility] Breastfeeding/Pumping videos  [x] Signs breastfeeding is going well (comfortable latch, audible swallows, age appropriate output and weight loss)    [] Tips to prevent engorgement  [] Signs of  engorgement  [] Tips to manage engorgement  [x] Pumping recommendations (based on patient need)  [] [facility] breast pump part/infant feeding supplies cleaning recommendations  [] Inpatient breastfeeding support  [x] Outpatient lactation resources    Discussed that Shirin/Isis has shown that she can latch, so that is not so much a concern, but her disinterest in latching means that America will need to do more stimulation to get her milk production stimulated.  She chose to pump rather than hand express and we talked about pumping likely being more efficient than hand expression in the next few days as she tries to increase milk volume.      Home Breast Pump: Spectra S1, reviewed how to use it and set it up so America could pump in the room.    Plan: Continue to attempt breastfeeding every 2-3 hours with a goal of 8-12 feedings every 24 hours.  Pump if no sustained latch is attained. Monitor for adequate output and follow up with pediatrican as recommended and for any concerns.       Encouraged follow up with outpatient lactation consultant  within 1 week after discharge.        Wendy Srinivasan RN, IBCLC   Lactation Consultant  [place]: [facility] [phone]  Office: [phone]

## 2025-02-06 NOTE — PLAN OF CARE
Goal Outcome Evaluation:    Problem: Adult Inpatient Plan of Care  Goal: Optimal Comfort and Wellbeing  Outcome: Progressing     Problem: Postpartum (Vaginal Delivery)  Goal: Absence of Infection Signs and Symptoms  Outcome: Progressing     Problem: Postpartum (Vaginal Delivery)  Goal: Optimal Pain Control and Function  Outcome: Progressing       Plan of Care Reviewed With: patient    Outcome Evaluation: Patient is stable but having pain and discomfort on her lower abdomen and perineum. Taking motrin and tylenol whic help a bit. She's voidng spontaneously and passing gas, Bonding well with baby . Will continue with plan of care.

## 2025-02-06 NOTE — PLAN OF CARE
Data: Vital signs and postpartum checks WDL  Patient eating and drinking normally. Patient able to empty bladder independently and is up ambulating.  Patient performing self cares and is able to care for infant. Breastfeeding on demand.  Action: Patient medicated during the shift for pain with Tylenol and Ibuprofen with slight relief after 1 hour. Patient education done see education record.  Response: Positive attachment behaviors observed with infant. Support persons  present.    Plan: Continue with the plan of care      Problem: Adult Inpatient Plan of Care  Goal: Optimal Comfort and Wellbeing  Intervention: Monitor Pain and Promote Comfort  Recent Flowsheet Documentation  Taken 2/6/2025 0233 by Lorenza Houston RN  Pain Management Interventions: medication (see MAR)  Taken 2/6/2025 0038 by Lorenza Houston RN  Pain Management Interventions: medication (see MAR)  Taken 2/5/2025 1957 by Lorenza Houston RN  Pain Management Interventions: medication (see MAR)   Goal Outcome Evaluation:      Plan of Care Reviewed With: patient    Overall Patient Progress: improvingOverall Patient Progress: improving

## 2025-02-06 NOTE — DISCHARGE SUMMARY
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  Couple asking if they really needed to do it or if their  would be screened as part of the 24 hr universal screening.  Referred to their peds provider.       Family history of malignant neoplasm of breast 2024     Priority: Medium     TWO maternal aunts:       Pt hx of fibrocystic breasts.    Screening at 35 or between pregnancies.        Situational anxiety 10/30/2013     Priority: Medium    CARDIOVASCULAR SCREENING; LDL GOAL LESS THAN 160 2012     Priority: Medium       INTERVAL HISTORY:  /63 (BP Location: Left arm, Patient Position: Semi-Salinas's, Cuff Size: Adult Regular)   Pulse 81   Temp 98.8  F (37.1  C) (Oral)   Resp 18   Ht 1.727 m (5' 8\")   Wt 81.2 kg (179 lb)   LMP 2024   SpO2 100%   Breastfeeding Unknown   BMI 27.22 kg/m    Pt stable, baby is rooming in  Breast feeding status:initiated  Complications since 2 hours post delivery: None  Patient is tolerating acitivity well Voiding without difficulty, cramping is relieved by Ibuprophen, lochia is decreasing and patient denies clots.  Perineal pain is is relieved by Ibuprophen.  The perineum laceration is well approximated    Postpartum hemoglobin   Hemoglobin   Date Value Ref Range Status   2025 11.7 11.7 - 15.7 g/dL Final   07/15/2013 13.8 11.7 - 15.7 g/dL Final     Blood typeA pos  Rubella status immune  History " of depression: no    ASSESSMENT/PLAN:  Normal postpartum exam   Stable Post-partum day #2  Complications:none  Plan d/c home today  RTC 6 weeks  Teaching done: D/C Instructions: Nutrition/Activity, Engorgement Management, Birth Control Options, Warning Signs/When to Call: Excessive Bleeding, Infection, PP Depression, Kegals and Crunches, RTC Clinic for PP Appointment, and PNV  Birthcontrol planned:Undecided. Fertility and contraception options reviewed.  Current Discharge Medication List        START taking these medications    Details   acetaminophen (TYLENOL) 325 MG tablet Take 2 tablets (650 mg) by mouth every 6 hours as needed for mild pain. Start after Delivery.  Qty: 100 tablet, Refills: 0    Associated Diagnoses:  (normal spontaneous vaginal delivery)      ibuprofen (ADVIL/MOTRIN) 600 MG tablet Take 1 tablet (600 mg) by mouth every 6 hours as needed for moderate pain. Start after delivery  Qty: 60 tablet, Refills: 0    Associated Diagnoses:  (normal spontaneous vaginal delivery)      senna-docusate (SENOKOT-S/PERICOLACE) 8.6-50 MG tablet Take 1 tablet by mouth daily. Start after delivery.  Qty: 100 tablet, Refills: 0    Associated Diagnoses:  (normal spontaneous vaginal delivery)           STOP taking these medications       Prenatal Vit-DSS-Fe Cbn-FA (PRENATAL AD PO) Comments:   Reason for Stopping:             TAVO Daley CNM, APRN CNM,  BERTHA

## 2025-02-08 ENCOUNTER — HEALTH MAINTENANCE LETTER (OUTPATIENT)
Age: 35
End: 2025-02-08

## 2025-02-18 NOTE — PROGRESS NOTES
Assessment:    Fifteen day old infant gaining weight well on breastfeeding with expressed milk supplementation  Sleepy at breast, but able to achieve deep latch and fair suck  Milk transfer slightly below to baby's needs during observed feeding in office today:  in need of some continued supplementation  Mother with normal milk supply  Mother with postpartum anxiety/depression    Plan:    Use good positioning for deep latch, with baby held close to body and baby's head/shoulders/hips in good alignment.  When in a seated position, use a pillow to help bring baby close to breasts, and stepstool to elevate your knees above hips.    When bringing your baby to your breast, compress your breast vertically and in line with baby's mouth--this will help them to get a larger mouthful of breast and a deeper latch. Babies latch best to the breast by bringing their chin in first, so point your nipple towards baby's nose, tuck the chin in close, and then wait for her mouth to open.  When her mouth opens, bring her head in deeply.  Baby's chin should be snugged deeply in your breast, their upper cheeks should be touching the breast, and their nose just out of the breast.   If there is pinching or pain, try using a finger to give a little gentle pressure on her chin to help her open more widely and take in more of your breast.  If it is still painful, use a finger to break the suction, remove baby from the breast and try again until there is no pain with nursing.  There is sometimes a little pain when the baby first begins sucking, but after the first few seconds there should be no pain--only a tugging feeling.   If you have concerns about baby's nose getting buried in your breast, just move the hand you have behind her head/neck down a little bit--so it is more on his head and shoulders.  This will allow his head to tilt back so that he can nurse comfortably and his nose will come out of  your breast.     Continue to nurse baby on  "cue, 8-12 times each day.  Once your baby has returned to their birthweight, you can trust them to awaken when they need to eat--you do not need to awaken them on a schedule.  When you nurse, feed on one side until baby finishes swallowing.  Once swallowing slows, use breast compression to encourage more intake, but once baby is sleeping, coming off the breast, or just \"nibbling,\" you can take baby off the breast and move to the other side.  Offer both breasts at each feeding.     Isis needs about 22 - 23 oz of milk each day to grow well.  If she nurses at home as she did in the office today, about 7 times/day, and takes planned two night bottles of about 3 oz each, then she needs about 10 oz per day in supplementation, using your breastmilk as your first choice.  You can give this after feedings, or distributed throughout the day according to her feeding cues.   Continue pumping as you have been to have this 15 oz extra milk to offer to Isis and support strong milk supply.  You only  need to pump for the amount of milk you need in bottles, so you can probably pump 4-5 times/day and have plenty of milk. Sometimes pumping while you have some warmth on your breasts (like a heating pad or microwaved hot pack) is helpful, and gentle massage can also help release more milk.   It is more effective to pump more frequently for shorter time periods than it is to pump less often for longer:  For example, it is better to pump 6 times/day for 15 minutes each than it is to pump 3 times/day for 30 minutes.     When pumping, start with the pump on the lighter, faster suck (called \"massage\" on your Spectra pump, and indicated by a cycle of 70).  Stay on this pattern for a few minutes, and then when the milk starts to flow after a minute or two, use the wave button to move to the \"expression\" mode, indicated by a cycle speed of 38 - 54.  This is a slower, deeper suck, like a baby does when the milk is flowing well.  This will help " "the milk to be released most efficiently.  Use the \"vacuum\" buttons to adjust to a comfortable level of suction--use the lowest suction strength that allows the milk to flow. Pumping for 15- 20 minutes is usually adequate for most people.  The Spectra pump will turn off on its own after 30 minutes, but you do not need to pump for this long.    If you find you become very full and uncomfortable between feedings, pump or hand-express just a small amount;  just enough to relieve your fullness and allow you to remain comfortable until the next feeding (like an ounce).  Do not pump to completely \"empty\" your breast, because this will encourage an increase in milk supply, which will just worsen the sensations of fullness.      Attaining a block of solid sleep at night can be very important in optimizing mental health, particularly in people who struggle with anxiety.  Consider choosing a time period, perhaps in the first part of the night, for a block of uninterrupted sleep, and nursing baby just before this time.  Your  or other helper can then offer one or two bottles of expressed milk or formula as needed, so that you can sleep for 4-6 hours.  If you find it overly exhausting or burdensome to pump to have bottles to offer during sleep periods, giving formula for one or two feedings/day is an option--you may find that you are doing this anyway.  As long as frequent nursing and/or pumping are occurring regularly, you will continue to support your milk supply.      [place] will have our clinic therapist contact you to set up a visit.  A \"triple feeding\" plan like this--nurse, supplement, pump--is very difficult, exhausting, and not sustainable over the long term.   The goal is to do it for a short time in order to help Isis gain weight well, be more energetic and effective at the breast, and support your milk supply.   It is important to re-evaluate the plan in 1-2 weeks to see if it has been effective and make sure " you have a comfortable feeding pattern going forward.   Follow up with lactation in one week, and pediatric provider as planned.  [employer] can be used for brief questions, but it's important to know that messages are not seen Friday through Sunday. If urgent help is needed, Monday through Friday you can call [phone] and one of our lactation consultants will get the message and respond; if you need a rapid response over a weekend or holiday, it is best to call your on-call maternity or pediatric provider.  Please feel free to schedule a return visit if the concern is more detailed;  telephone visits are also an option if you don't feel you need to be seen in person.     Subjective: America and Dinesh are here today with the following concerns:  Latching:  noticing increasingly painful nipples;  feels this may be more with pumping than with direct nursing  Milk transfer:  baby taking bottles after nursing, so concerned that baby is not transferring any milk  Milk production:  concerned that she felt minimal change in her breasts after birth  Baby very sleepy at breast and difficult to keep awake to feed    She is vaccinated for Covid-19, with most recent dose 10/14/24    Hospital Course: Spontaneous labor and uncomplicated birth;  postpartum complicated by cord avulsion requiring manual removal. Seen by hospital IBCLC;  baby very sleepy at breast.  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    Breastfeeding Goals: continued breastfeeding    Previous Breastfeeding Experience: first baby    Infant's name: Isis Pastor  Infant's bday: [birth year]  Gestational age: 41w2d  Infant's birth weight: 8 # 8.9 oz   Discharge weight: 7 # 14.3 oz     Pediatric Provider: [facility], JACKI Good  Recent weights:  2/12/25:  7 # 14.5 oz    Frequency and duration of feedings: waking baby every 2  "-3 hours  Swallows audible per mother: unsure  Numbers of feedings in 24 hours: 8 - 10  Number urines per day: 8 - 10  Number of stools per day and their color: 8 - 10    Supplementation: with 2-3 oz each feeding   Pumpin -9 times/day,  yielding 2-3 oz each pump session. Uses a Spectra pump with a 26mm flange.    Objective/Physical exam:   Mother: Noticed breasts grew larger and areolas darkened during pregnancy and she is unsure if she noticed primary engorgement when her milk came in.    Objective/Physical exam:   Her nipples are everted, the areola is compressible, the breast is soft and full.     Sore nipples: tender   EPDS: 12, with \"never\" marked for question on thoughts of self-harm.  America states she feels she is doing \"not well.\"  No difficulties with sleeping or eating, and has no current or past suicidal ideation.    Assessment of infant: 64.66% Weight for age percentile   Age today: 15 days  Today's weight:  8# 9.6 oz  Amount of milk transferred from LEFT side: none measurable  Amount of milk transferred from RIGHT side: 1 oz    Baby has full flexion of arms and legs, normal tone, behavior is alert and active, respirations are normal, skin is normal, hydration is normal, jaw is normal size and alignment, palate is normal, frenulum is normal, baby can lateralize tongue, has adequate tongue lift, and tongue can protrude past bottom gum line. Upper labial frenulum is normal.    Suck exam:  Baby has strong, coordinated suck with good tongue cupping    Baby thrush: none    Jaundice: none      Feeding assessment: Baby can hold suction with tongue while at the breast.     Alignment: The baby was flex relaxed. Baby's head was aligned with its trunk. Baby did face mother. Baby was in cross cradle position today.   Areolar Grasp: Baby had initially shallow latch, managed with gentle chin pressure.  Baby's lips were not pursed. Baby's lips did flange outward. Tongue was visible over bottom gum. Baby had " complete seal.     Areolar Compression: Baby was sleepy at breast, but did make periods of rhythmic motion. There were no clicking or smacking sounds. There was no severe nipple discomfort. Nipples appeared rounded after feeding.    Audible swallowing: Baby made quiet sounds of swallowing: There was an increase in frequency after milk ejection reflex. The milk ejection reflex is normal and milk supply is normal.     /83 (BP Location: Left arm, Patient Position: Sitting, Cuff Size: Adult Regular)   Pulse 80   LMP 2024   SpO2 96%   Breastfeeding Yes   OB History    Para Term  AB Living   1 1 1 0 0 1   SAB IAB Ectopic Multiple Live Births   0 0 0 0 1      # Outcome Date GA Lbr Arvind/2nd Weight Sex Type Anes PTL Lv   1 Term 25 41w2d 02:39 / 03:52 3.88 kg (8 lb 8.9 oz) F Vag-Spont EPI N HUMZA      Name: Female-America Pastor      Apgar1: 8  Apgar5: 9       Current Outpatient Medications:     acetaminophen (TYLENOL) 325 MG tablet, Take 2 tablets (650 mg) by mouth every 6 hours as needed for mild pain. Start after Delivery. (Patient not taking: Reported on 2025), Disp: 100 tablet, Rfl: 0    ibuprofen (ADVIL/MOTRIN) 600 MG tablet, Take 1 tablet (600 mg) by mouth every 6 hours as needed for moderate pain. Start after delivery (Patient not taking: Reported on 2025), Disp: 60 tablet, Rfl: 0    senna-docusate (SENOKOT-S/PERICOLACE) 8.6-50 MG tablet, Take 1 tablet by mouth daily. Start after delivery. (Patient not taking: Reported on 2025), Disp: 100 tablet, Rfl: 0  Past Medical History:   Diagnosis Date    Fibrocystic breast     Seasonal allergic rhinitis      Past Surgical History:   Procedure Laterality Date    ORTHOPEDIC SURGERY Left     ACL age 18    wisdom teeth      24 years old     Family History   Problem Relation Age of Onset    Fibrocystic breast disease Mother     C.A.D. Father     Heart Disease Father     No Known Problems Brother     Alzheimer Disease Maternal  Grandmother     No Known Problems Maternal Grandfather     No Known Problems Paternal Grandmother     No Known Problems Paternal Grandfather     Breast Cancer Maternal Aunt 42    Autism Spectrum Disorder Maternal Aunt     Breast Cancer Maternal Aunt 65       Time spent on day of service:    Face-to-face visit:   75 min   Documentation:  19 min   Total time spent: 94 min    TAVO Alcocer, CNMAGDALENE, IBCLC

## 2025-02-19 ENCOUNTER — OFFICE VISIT (OUTPATIENT)
Dept: OBGYN | Facility: CLINIC | Age: 35
End: 2025-02-19
Payer: COMMERCIAL

## 2025-02-19 VITALS — DIASTOLIC BLOOD PRESSURE: 83 MMHG | SYSTOLIC BLOOD PRESSURE: 110 MMHG | OXYGEN SATURATION: 96 % | HEART RATE: 80 BPM

## 2025-02-19 DIAGNOSIS — O92.29 POSTPARTUM NIPPLE PAIN: ICD-10-CM

## 2025-02-19 PROCEDURE — 99215 OFFICE O/P EST HI 40 MIN: CPT | Performed by: ADVANCED PRACTICE MIDWIFE

## 2025-02-19 PROCEDURE — 99417 PROLNG OP E/M EACH 15 MIN: CPT | Performed by: ADVANCED PRACTICE MIDWIFE

## 2025-02-19 ASSESSMENT — EDINBURGH POSTNATAL DEPRESSION SCALE (EPDS)
TOTAL SCORE: 12
I HAVE BEEN SO UNHAPPY THAT I HAVE BEEN CRYING: YES, QUITE OFTEN
I HAVE BEEN ANXIOUS OR WORRIED FOR NO GOOD REASON: YES, SOMETIMES
THE THOUGHT OF HARMING MYSELF HAS OCCURRED TO ME: NEVER
I HAVE BLAMED MYSELF UNNECESSARILY WHEN THINGS WENT WRONG: YES, SOME OF THE TIME
I HAVE BEEN SO UNHAPPY THAT I HAVE HAD DIFFICULTY SLEEPING: NOT AT ALL
I HAVE BEEN ABLE TO LAUGH AND SEE THE FUNNY SIDE OF THINGS: NOT QUITE SO MUCH NOW
I HAVE FELT SAD OR MISERABLE: YES, QUITE OFTEN
I HAVE LOOKED FORWARD WITH ENJOYMENT TO THINGS: DEFINITELY LESS THAN I USED TO
I HAVE FELT SCARED OR PANICKY FOR NO GOOD REASON: NO, NOT AT ALL
THINGS HAVE BEEN GETTING ON TOP OF ME: NO, MOST OF THE TIME I HAVE COPED QUITE WELL

## 2025-02-19 NOTE — PATIENT INSTRUCTIONS
Use good positioning for deep latch, with baby held close to body and baby's head/shoulders/hips in good alignment.  When in a seated position, use a pillow to help bring baby close to breasts, and stepstool to elevate your knees above hips.    When bringing your baby to your breast, compress your breast vertically and in line with baby's mouth--this will help them to get a larger mouthful of breast and a deeper latch. Babies latch best to the breast by bringing their chin in first, so point your nipple towards baby's nose, tuck the chin in close, and then wait for her mouth to open.  When her mouth opens, bring her head in deeply.  Baby's chin should be snugged deeply in your breast, their upper cheeks should be touching the breast, and their nose just out of the breast.   If there is pinching or pain, try using a finger to give a little gentle pressure on her chin to help her open more widely and take in more of your breast.  If it is still painful, use a finger to break the suction, remove baby from the breast and try again until there is no pain with nursing.  There is sometimes a little pain when the baby first begins sucking, but after the first few seconds there should be no pain--only a tugging feeling.   If you have concerns about baby's nose getting buried in your breast, just move the hand you have behind her head/neck down a little bit--so it is more on his head and shoulders.  This will allow his head to tilt back so that he can nurse comfortably and his nose will come out of  your breast.     Isis needs about 22 - 23 oz of milk each day to grow well.  If she nurses at home as she did in the office today, about 7 times/day, and takes planned two night bottles of about 3 oz each, then she needs about 10 oz per day in supplementation, using your breastmilk as your first choice.  You can give this after feedings, or distributed throughout the day according to her feeding cues.   Continue pumping as you  "have been to have this 15 oz extra milk to offer to Isis and support strong milk supply.  You only  need to pump for the amount of milk you need in bottles, so you can probably pump 4-5 times/day and have plenty of milk. Sometimes pumping while you have some warmth on your breasts (like a heating pad or microwaved hot pack) is helpful, and gentle massage can also help release more milk.   It is more effective to pump more frequently for shorter time periods than it is to pump less often for longer:  For example, it is better to pump 6 times/day for 15 minutes each than it is to pump 3 times/day for 30 minutes.     When pumping, start with the pump on the lighter, faster suck (called \"massage\" on your Spectra pump, and indicated by a cycle of 70).  Stay on this pattern for a few minutes, and then when the milk starts to flow after a minute or two, use the wave button to move to the \"expression\" mode, indicated by a cycle speed of 38 - 54.  This is a slower, deeper suck, like a baby does when the milk is flowing well.  This will help the milk to be released most efficiently.  Use the \"vacuum\" buttons to adjust to a comfortable level of suction--use the lowest suction strength that allows the milk to flow. Pumping for 15- 20 minutes is usually adequate for most people.  The Spectra pump will turn off on its own after 30 minutes, but you do not need to pump for this long.    If you find you become very full and uncomfortable between feedings, pump or hand-express just a small amount;  just enough to relieve your fullness and allow you to remain comfortable until the next feeding (like an ounce).  Do not pump to completely \"empty\" your breast, because this will encourage an increase in milk supply, which will just worsen the sensations of fullness.      Attaining a block of solid sleep at night can be very important in optimizing mental health, particularly in people who struggle with anxiety.  Consider choosing a time " "period, perhaps in the first part of the night, for a block of uninterrupted sleep, and nursing baby just before this time.  Your  or other helper can then offer one or two bottles of expressed milk or formula as needed, so that you can sleep for 4-6 hours.  If you find it overly exhausting or burdensome to pump to have bottles to offer during sleep periods, giving formula for one or two feedings/day is an option--you may find that you are doing this anyway.  As long as frequent nursing and/or pumping are occurring regularly, you will continue to support your milk supply.      [place] will have our clinic therapist contact you to set up a visit.  A \"triple feeding\" plan like this--nurse, supplement, pump--is very difficult, exhausting, and not sustainable over the long term.   The goal is to do it for a short time in order to help Isis gain weight well, be more energetic and effective at the breast, and support your milk supply.   It is important to re-evaluate the plan in 1-2 weeks to see if it has been effective and make sure you have a comfortable feeding pattern going forward.   Follow up with lactation in one week, and pediatric provider as planned.  [employer] can be used for brief questions, but it's important to know that messages are not seen Friday through Sunday. If urgent help is needed, Monday through Friday you can call [phone] and one of our lactation consultants will get the message and respond; if you need a rapid response over a weekend or holiday, it is best to call your on-call maternity or pediatric provider.  Please feel free to schedule a return visit if the concern is more detailed;  telephone visits are also an option if you don't feel you need to be seen in person.   _______________________    This is a great video that shows a baby nursing well at the breast, and how to tell when baby is actively swallowing:      Is Your Baby Getting Enough Milk? "   https://globalhealthmedia.org/videos/is-your-baby-getting-enough-milk/       ______________    To know if your baby is getting enough milk, the main things to rely on are:  The baby's weight (should regain birthweight at around 2 weeks, and then gain around 1 oz/day after that)  Poop.  Baby should be having yellow, runny poop by day 5, and multiple times each day  Pee:  about as many wet diapers as they are days old for the first 5-6 days, and then continue with at least 5-6 wet diapers/day.    Whether your baby takes milk from a bottle after nursing, how firm or soft your breasts feel, how much milk comes when you pump, how often your baby feeds or how fussy your baby is are NOT good ways to assess whether or not your baby is getting enough milk from your breasts.  For a more complete explanation, see these two excellent articles by RAS Barrera:      Reliable Signs that Your Baby Is Getting Enough Milk  https://www.Kai[employer].au/baby/signs-baby-is-getting-enough-milk/      Signs NOT to rely on for estimating how much milk your baby is getting  https://www.Kai[employer].au/breastfeeding/5-unreliable-signs-that-your-baby-pm-whngtqd-mqrcbf-milk/       __________________    Postpartum Emotional Support/Mental Health Resources:    Postpartum Support [place]:  Https://www.[facility] Hospital[facility]ortmn.[email or web address]/get_help   Helpline:  692.404.9676;  Email:  Dalila@[employer]    [facility] Mother-Baby Program   [employer]:  156-543-SLII:  Leave message, will call back within 2 days   Availability of multiple types of therapy programs    [facility]:  Postpartum mental health services offered free of charge to Medicaid clients.  Offering video visits, and some in-office or in-home visits.   Phone:  [phone]   Email:  info@[employer]   Website:  [employer]      Emergency Resource Phone Numbers for [place]:    Crisis Connection:  720.464.4018   National Suicide Prevention Line:   7-335-218-TALK   [facility] Crisis Program:  349.591.1163   [facility] Crisis Line:  516.271.4345   [facility] Crisis Line:  132.348.1740   [facility] Crisis Line:  111.378.7525   [facility] Crisis Line:  593.734.9933   [facility] Services:  [phone]   [facility] Crisis Line:  [phone]   [facility]:  0-672-073-1412       ________________    Good breastfeeding resources:    Websites:  www.[employer]  www.[employer]Kiko/blog/  www.[employer].org/breastfeeding-info/    Instagram:    @SpinPunchnathaly  @thebetterboob    Books:   The Womanly Art of Breastfeeding by [employer]  Breastfeeding Doesn't Need to Suck, by Kathrine Samson      For help with using baby carriers:  https://babywearingtwincities.org/

## 2025-02-25 NOTE — PROGRESS NOTES
"Assessment:   Three week old infant gaining weight well on breastfeeding with supplementation using expressed milk  Fair latch, but weak and non-nutritive suck at breast, with very low milk transfer:  in need of full supplementation  Mother with normal milk supply  Mother with some anxiety/depression, initiating therapy      Plan:    Continue to feed Isis on cue, 8-12 times each day.  To make the feeding plan manageable, you  may want to limit breastfeeding to about half of feedings (like during the daytime) and just pump/bottlefeed  for the remaining feedings.   When you nurse, use breast compressions and stimulation to keep Isis active at the breast.  Feed on one side until she is no longer swallowing with most sucks.  She will likely continue suckling, but if she is not nursing productively, with good swallowing, you can stop the feeding.  Once she is sleeping, coming off the breast, or just \"nibbling,\"  take her off the breast and move to the other side.  Isis needs about 24 oz of milk each day to grow well. She will need this amount in bottles until she is able to nurse more effectively, so if she is eating about 8 times/day, this is about 3 oz each feeding.  Continue pumping as you have been to have this extra milk to offer to Isis and promote strong milk supply;  you just need to pump the amount of milk that you need for the day.    Given breastfeeding challenges, although some families pursue all avenues and some wean to formula entirely, there are many other options for coping. These can include decreasing pumping efforts and using formula for supplementation, setting a time boundary on continuing to \"triple feed,\" or moving to exclusive pumping.  Breastfeeding does not need to be a black-and-white choice, and you can consider what works best for your family.     Follow up with pediatric provider in 10 days and lactation in about 3 weeks.  [employer] can be used for brief questions, but it's important to know " that messages are not seen Friday through Sunday. If urgent help is needed, Monday through Friday you can call [phone] and one of our lactation consultants will get the message and respond; if you need a rapid response over a weekend or holiday, it is best to call your on-call maternity or pediatric provider.  Please feel free to schedule a return visit if the concern is more detailed;  telephone visits are also an option if you don't feel you need to be seen in person.       Subjective: Nathaly are here today for a follow up visit.  They were first seen one week ago with concerns about painful nipples and milk transfer;  at that time baby Isis was very sleepy at breast and had low milk transfer;  continued supplementation was discussed.  A referral for therapy was also done, as America was having some postpartum depression and anxiety.  She reports today that:  She is feeling much much more relaxed now feeding on cue rather than awakening baby every 2 hours, and routine of breastfeeding with supplementation is feeling manageable  However, she also feels that Isis continues to seem unsatisfied after nursing even if nursing a long time, and is taking large amounts in supplementation after each nursing.  Does do well with bottle.  Would like to develop a sustainable feeding plan    She is vaccinated for Covid-19, with most recent dose 10/14/24    Hospital Course: Spontaneous labor and uncomplicated birth;  postpartum complicated by cord avulsion requiring manual removal. Seen by hospital IBCLC;  baby very sleepy at breast.  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    Breastfeeding Goals: continued breastfeeding    Previous Breastfeeding Experience: first baby    Infant's name: Isis Pastor  Infant's bday: [birth year]  Gestational age: 41w2d  Infant's birth weight: 8 # 8.9 oz    Discharge weight: 7 # 14.3 oz     Pediatric Provider: [facility] [place], JACKI Good  Recent weights:  25:  7 # 14.5 oz    Frequency and duration of feedings: every 3 - 4 hours  Swallows audible per mother: yes  Numbers of feedings in 24 hours: 8   Number urines per day:12   Number of stools per day and their color: 8, loose yellow    Supplementation: with most feedings, taking 3 - 4 oz  Pumpin - 7 times/day,  yielding 23 - 26 oz/day    Objective/Physical exam:   Mother: Noticed breasts grew larger and areolas darkened during pregnancy and she is unsure if she noticed primary engorgement when her milk came in.    Objective/Physical exam:   Her nipples are everted, the areola is compressible, the breast is soft and full.     Sore nipples: none  EPDS: not completed today;  states she is feeling better and is initiating therapy    Assessment of infant: 64.56% Weight for age percentile   Age today: 3 weeks  Today's weight:  9 # 2.2 oz    Amount of milk transferred from LEFT side: none measurable  Amount of milk transferred from RIGHT side: 0.2 oz    Baby has full flexion of arms and legs, but may have somewhat low tone.  Behavior is alert and active, respirations are normal, skin is normal, hydration is normal, jaw is normal size and alignment, palate is normal, frenulum is normal, baby can lateralize tongue, has adequate tongue lift, and tongue can protrude past bottom gum line. Upper labial frenulum is normal.    Suck exam:  Baby has strong, coordinated suck with good tongue cupping    Baby thrush: none    Jaundice: none      Feeding assessment: Baby can hold suction with tongue while at the breast.     Alignment: The baby was flex relaxed. Baby's head was aligned with its trunk. Baby did face mother. Baby was in cross cradle, laid-back and sidelying positions today.   Areolar Grasp: Baby had initially shallow latch, managed with gentle chin pressure.  Baby's lips were not pursed. Baby's lips did  flange outward. Tongue was visible over bottom gum. Baby had complete seal.     Areolar Compression:  Baby required stimulation to continue productive suckling;  frequently moved to non-nutritive sucking.  There were no clicking or smacking sounds. There was no severe nipple discomfort. Nipples appeared rounded after feeding.    Audible swallowing: Baby made few quiet sounds of swallowing: There was no noted increase in frequency after milk ejection reflex. The milk ejection reflex is apparently normal and milk supply is normal.     /77   Pulse 72   Wt 70.7 kg (155 lb 12.8 oz)   LMP 2024   SpO2 96%   Breastfeeding Yes   BMI 23.69 kg/m    OB History    Para Term  AB Living   1 1 1 0 0 1   SAB IAB Ectopic Multiple Live Births   0 0 0 0 1      # Outcome Date GA Lbr Arvind/2nd Weight Sex Type Anes PTL Lv   1 Term 25 41w2d 02:39 / 03:52 3.88 kg (8 lb 8.9 oz) F Vag-Spont EPI N HUMZA      Name: Female-America Pastor      Apgar1: 8  Apgar5: 9       Current Outpatient Medications:     Prenatal Vit-Fe Fumarate-FA (PRENATAL MULTIVITAMIN  PLUS IRON) 27-1 MG TABS, Take by mouth daily., Disp: , Rfl:   Past Medical History:   Diagnosis Date    Fibrocystic breast     Seasonal allergic rhinitis      Past Surgical History:   Procedure Laterality Date    ORTHOPEDIC SURGERY Left     ACL age 18    wisdom teeth      24 years old     Family History   Problem Relation Age of Onset    Fibrocystic breast disease Mother     C.A.D. Father     Heart Disease Father     No Known Problems Brother     Alzheimer Disease Maternal Grandmother     No Known Problems Maternal Grandfather     No Known Problems Paternal Grandmother     No Known Problems Paternal Grandfather     Breast Cancer Maternal Aunt 42    Autism Spectrum Disorder Maternal Aunt     Breast Cancer Maternal Aunt 65       Time spent on day of service:    Face-to-face visit:   64 min   Documentation:  12 min min   Total time spent: 76 min    Georgia Martinez  Farida, APRN, CNM, IBCLC

## 2025-02-26 ENCOUNTER — OFFICE VISIT (OUTPATIENT)
Dept: OBGYN | Facility: CLINIC | Age: 35
End: 2025-02-26
Payer: COMMERCIAL

## 2025-02-26 VITALS
OXYGEN SATURATION: 96 % | SYSTOLIC BLOOD PRESSURE: 109 MMHG | BODY MASS INDEX: 23.69 KG/M2 | DIASTOLIC BLOOD PRESSURE: 77 MMHG | HEART RATE: 72 BPM | WEIGHT: 155.8 LBS

## 2025-02-26 DIAGNOSIS — O92.79 OTHER DISORDERS OF LACTATION: Primary | ICD-10-CM

## 2025-02-26 PROCEDURE — 3078F DIAST BP <80 MM HG: CPT | Performed by: ADVANCED PRACTICE MIDWIFE

## 2025-02-26 PROCEDURE — 99417 PROLNG OP E/M EACH 15 MIN: CPT | Performed by: ADVANCED PRACTICE MIDWIFE

## 2025-02-26 PROCEDURE — 99215 OFFICE O/P EST HI 40 MIN: CPT | Performed by: ADVANCED PRACTICE MIDWIFE

## 2025-02-26 PROCEDURE — 3074F SYST BP LT 130 MM HG: CPT | Performed by: ADVANCED PRACTICE MIDWIFE

## 2025-02-26 RX ORDER — VITAMIN A, VITAMIN C, VITAMIN D-3, VITAMIN E, VITAMIN B-1, VITAMIN B-2, NIACIN, VITAMIN B-6, CALCIUM, IRON, ZINC, COPPER 4000; 120; 400; 22; 1.84; 3; 20; 10; 1; 12; 200; 27; 25; 2 [IU]/1; MG/1; [IU]/1; MG/1; MG/1; MG/1; MG/1; MG/1; MG/1; UG/1; MG/1; MG/1; MG/1; MG/1
TABLET ORAL DAILY
COMMUNITY

## 2025-02-26 NOTE — PATIENT INSTRUCTIONS
"   Continue to feed Isis on cue, 8-12 times each day.  To make the feeding plan manageable, you  may want to limit breastfeeding to about half of feedings (like during the daytime) and just pump/bottlefeed  for the remaining feedings.   When you nurse, use breast compressions and stimulation to keep Isis active at the breast.  Feed on one side until she is no longer swallowing with most sucks.  She will likely continue suckling, but if she is not nursing productively, with good swallowing, you can stop the feeding.  Once she is sleeping, coming off the breast, or just \"nibbling,\"  take her off the breast and move to the other side.  Isis needs about 24 oz of milk each day to grow well. She will need this amount in bottles until she is able to nurse more effectively, so if she is eating about 8 times/day, this is about 3 oz each feeding.  Continue pumping as you have been to have this extra milk to offer to Isis and promote strong milk supply;  you just need to pump the amount of milk that you need for the day.    Given breastfeeding challenges, although some families pursue all avenues and some wean to formula entirely, there are many other options for coping. These can include decreasing pumping efforts and using formula for supplementation, setting a time boundary on continuing to \"triple feed,\" or moving to exclusive pumping.  Breastfeeding does not need to be a black-and-white choice, and you can consider what works best for your family.     Follow up with pediatric provider next week and lactation in about 3 weeks.  [employer] can be used for brief questions, but it's important to know that messages are not seen Friday through Sunday. If urgent help is needed, Monday through Friday you can call [phone] and one of our lactation consultants will get the message and respond; if you need a rapid response over a weekend or holiday, it is best to call your on-call maternity or pediatric provider.  Please feel free " to schedule a return visit if the concern is more detailed;  telephone visits are also an option if you don't feel you need to be seen in person.

## 2025-03-03 NOTE — PROGRESS NOTES
[facility]: Integrated Behavioral Health    Integrated Behavioral Health   Mental Health & Addiction Services      Progress Note - Initial [facility] Visit     Patient Name: America Pastor    Date: 2025  Service Type: Individual   Visit Start Time: 11:05 AM  Visit End Time:  11:35 AM   Attendees: Client   Service Modality: Video Visit:      Provider verified identity through the following two step process.  Patient provided:  Patient  and Patient address    Telemedicine Visit: The patient's condition can be safely assessed and treated via synchronous audio and visual telemedicine encounter.      Reason for Telemedicine Visit: Patient has requested telehealth visit    Originating Site (Patient Location): Patient's home    Distant Site (Provider Location): Provider Remote Setting- Home Office    Consent:  The patient/guardian has verbally consented to: the potential risks and benefits of telemedicine (video visit) versus in person care; bill my insurance or make self-payment for services provided; and responsibility for payment of non-covered services.     Patient would like the video invitation sent by:  My Chart    Mode of Communication:  [facility] Conference [facility]    Distant Location (Provider):  Off-site    As the provider I attest to compliance with applicable laws and regulations related to telemedicine.     [facility] Visit Activities (Refresh list every visit): NEW         DATA:     Interactive Complexity: No   Crisis: No     Assessments completed prior to this visit:     The following assessments were completed by patient for this visit:  PHQ9:       7/15/2013     4:15 PM 2024     3:14 PM 2025     3:43 PM 3/4/2025    10:50 AM   PHQ-9 SCORE   PHQ-9 Total Score 2      PHQ-9 Total Score MyChart   2 (Minimal depression) 9 (Mild depression)   PHQ-9 Total Score  2 2  9        Patient-reported     GAD7:        No data to display              CAGE-AID:       3/4/2025     10:51 AM   CAGE-AID Total Score   Total Score 0    Total Score MyChart 0 (A total score of 2 or greater is considered clinically significant)       Patient-reported     [place] Suicide Severity Rating Scale (Lifetime/Recent)      2/4/2025     1:07 PM 3/4/2025    11:31 AM   [place] Suicide Severity Rating (Lifetime/Recent)   Q1 Wished to be Dead (Past Month) 0-->no    Q2 Suicidal Thoughts (Past Month) 0-->no    Q6 Suicide Behavior (Lifetime) 0-->no    Level of Risk per Screen no risks indicated    1. Wish to be Dead (Lifetime)  N   1. Wish to be Dead (Past 1 Month)  N   2. Non-Specific Active Suicidal Thoughts (Lifetime)  N   2. Non-Specific Active Suicidal Thoughts (Past 1 Month)  N   3. Active Suicidal Ideation with any Methods (Not Plan) Without Intent to Act (Lifetime)  N   3. Active Suicidal Ideation with any Methods (Not Plan) Without Intent to Act (Past 1 Month)  N   4. Active Suicidal Ideation with Some Intent to Act, Without Specific Plan (Lifetime)  N   4. Active Suicidal Ideation with Some Intent to Act, Without Specific Plan (Past 1 Month)  N   5. Active Suicidal Ideation with Specific Plan and Intent (Lifetime)  N   5. Active Suicidal Ideation with Specific Plan and Intent (Past 1 Month)  N   Actual Attempt (Lifetime)  N   Has subject engaged in non-suicidal self-injurious behavior? (Lifetime)  N   Interrupted Attempts (Lifetime)  N   Aborted or Self-Interrupted Attempt (Lifetime)  N   Preparatory Acts or Behavior (Lifetime)  N   Calculated C-SSRS Risk Score (Lifetime/Recent)  No Risk Indicated        Referral:   Patient was referred to [facility] by Midwife.    Reason for referral: determine behavioral health treatment options.      [facility] introduced self and role. Discussed informed consent and limits to confidentiality.     Presenting Concerns/ Current Stressors:   Initial anxiety postpartum had to do with feeding and this is going better.  PT has no issues going to sleep at night  but when she gets up to  pump or feed she can't get back to sleep.  Pt can't stop worrying if something will happen to the baby while she is asleep. Meditations, sound machine isn't helping.  Discussed breaking up night using meditations, warm tea, warm shower, weighted blanket etc.   has 6 weeks off of work and she is worried about that transition.     Therapeutic Interventions:  Motivational Interviewing (MI): Validated patient's thoughts, feelings and experience. Expressed respect for patient's autonomy in decision making.  Asked open-ended questions to invite patient's self-reflection and self-direction around change and what is important for them in working towards their goals.  Expressed and demonstrated empathy through reflective listening.   Psycho-education: Provided psycho-education about patient's behavioral health condition and symptoms. Explained and reviewed treatment options.    Response to treatment interventions:   Patient was receptive to interventions utilized.  Patient was engaged in the therapy process.      Safety Issues and Plan for Safety and Risk Management:     Patient denies a history of suicidal ideation, suicide attempts, self-injurious behavior, homicidal ideation, homicidal behavior, and and other safety concerns   Patient denies current fears or concerns for personal safety.   Patient denies current or recent suicidal ideation or behaviors.   Patient denies current or recent homicidal ideation or behaviors.   Patient denies current or recent self injurious behavior or ideation.   Patient denies other safety concerns.   Recommended that patient call 911 or go to the local ED should there be a change in any of these risk factors   Patient reports there are no firearms in the house.       ASSESSMENT:   Mental Status:     Appearance:   Appropriate    Eye Contact:   Good    Psychomotor Behavior: Normal    Attitude:   Friendly Pleasant   Orientation:   All   Speech Rate / Production: Normal/ Responsive    Volume:   Normal    Mood:    Anxious  Sad    Affect:    Appropriate    Thought Content:  Clear    Thought Form:  Goal Directed    Insight:    Good         Diagnostic Criteria:   Adjustment Disorder  A. The development of emotional or behavioral symptoms in response to an identifiable stressor(s) occurring within 3 months of the onset of the stressor(s)  B. These symptoms or behaviors are clinically significant, as evidenced by one or both of the following:  C. The stress-related disturbance does not meet criteria for another disorder & is not not an exacerbation of another mental disorder  D. The symptoms do not represent normal bereavement  E. Once the stressor or its consequences have terminated, the symptoms do not persist for more than an additional 6 months       * Adjustment Disorder with Anxiety: The predominant manfestations are symptoms such as nervousness, worry, or jitteriness, or, in children separation anxiety from major attachment figures        DSM5 Diagnoses: (Sustained by DSM5 Criteria Listed Above)     Diagnoses: Adjustment Disorders  309.24 (F43.22) With anxiety     Psychosocial / Contextual Factors: Postpartum       Collateral Reports Completed:   Not Applicable          PLAN: (Homework, other):     1. Patient was provided:  recommendation to schedule follow-up with [facility]     2. Provider recommended the following referrals: NA.        3. Suicide Risk and Safety Concerns were assessed for America Pastor    Safety Plan:   Patient denied any current/recent/lifetime history of suicidal ideation and/or behaviors. Recommended that patient call 911 or go to the local ED should there be a change in any of these risk factors       KARLO Pathak   March 4, 2025

## 2025-03-04 ENCOUNTER — VIRTUAL VISIT (OUTPATIENT)
Dept: BEHAVIORAL HEALTH | Facility: CLINIC | Age: 35
End: 2025-03-04
Payer: COMMERCIAL

## 2025-03-04 DIAGNOSIS — F43.22 ADJUSTMENT DISORDER WITH ANXIETY: Primary | ICD-10-CM

## 2025-03-04 PROCEDURE — 90832 PSYTX W PT 30 MINUTES: CPT | Mod: 95 | Performed by: SOCIAL WORKER

## 2025-03-04 ASSESSMENT — COLUMBIA-SUICIDE SEVERITY RATING SCALE - C-SSRS
2. HAVE YOU ACTUALLY HAD ANY THOUGHTS OF KILLING YOURSELF?: NO
ATTEMPT LIFETIME: NO
1. HAVE YOU WISHED YOU WERE DEAD OR WISHED YOU COULD GO TO SLEEP AND NOT WAKE UP?: NO
TOTAL  NUMBER OF ABORTED OR SELF INTERRUPTED ATTEMPTS LIFETIME: NO
4. HAVE YOU HAD THESE THOUGHTS AND HAD SOME INTENTION OF ACTING ON THEM?: NO
1. IN THE PAST MONTH, HAVE YOU WISHED YOU WERE DEAD OR WISHED YOU COULD GO TO SLEEP AND NOT WAKE UP?: NO
2. HAVE YOU ACTUALLY HAD ANY THOUGHTS OF KILLING YOURSELF?: NO
6. HAVE YOU EVER DONE ANYTHING, STARTED TO DO ANYTHING, OR PREPARED TO DO ANYTHING TO END YOUR LIFE?: NO
5. HAVE YOU STARTED TO WORK OUT OR WORKED OUT THE DETAILS OF HOW TO KILL YOURSELF? DO YOU INTEND TO CARRY OUT THIS PLAN?: NO
TOTAL  NUMBER OF INTERRUPTED ATTEMPTS LIFETIME: NO
3. HAVE YOU BEEN THINKING ABOUT HOW YOU MIGHT KILL YOURSELF?: NO
5. HAVE YOU STARTED TO WORK OUT OR WORKED OUT THE DETAILS OF HOW TO KILL YOURSELF? DO YOU INTEND TO CARRY OUT THIS PLAN?: NO
4. HAVE YOU HAD THESE THOUGHTS AND HAD SOME INTENTION OF ACTING ON THEM?: NO

## 2025-03-04 ASSESSMENT — PATIENT HEALTH QUESTIONNAIRE - PHQ9
SUM OF ALL RESPONSES TO PHQ QUESTIONS 1-9: 9
10. IF YOU CHECKED OFF ANY PROBLEMS, HOW DIFFICULT HAVE THESE PROBLEMS MADE IT FOR YOU TO DO YOUR WORK, TAKE CARE OF THINGS AT HOME, OR GET ALONG WITH OTHER PEOPLE: NOT DIFFICULT AT ALL
SUM OF ALL RESPONSES TO PHQ QUESTIONS 1-9: 9

## 2025-03-13 ENCOUNTER — VIRTUAL VISIT (OUTPATIENT)
Dept: BEHAVIORAL HEALTH | Facility: CLINIC | Age: 35
End: 2025-03-13
Payer: COMMERCIAL

## 2025-03-13 DIAGNOSIS — F43.22 ADJUSTMENT DISORDER WITH ANXIETY: Primary | ICD-10-CM

## 2025-03-13 NOTE — PROGRESS NOTES
[facility]: Integrated Behavioral Health     Integrated Behavioral Health Services   Mental Health and Addiction Services     Brief Diagnostic Assessment        PATIENT'S NAME: America aPstor  MRN: [id number]     : 1990     DATE OF SERVICE: 2025  SERVICE LOCATION: MyChart / Email (patient reached)   SERVICE MODALITY: Video Visit:      Provider verified identity through the following two step process.  Patient provided:  Patient  and Patient address    Telemedicine Visit: The patient's condition can be safely assessed and treated via synchronous audio and visual telemedicine encounter.      Reason for Telemedicine Visit: Patient has requested telehealth visit    Originating Site (Patient Location): Patient's home    Distant Site (Provider Location): [facility] [place]    Consent:  The patient/guardian has verbally consented to: the potential risks and benefits of telemedicine (video visit) versus in person care; bill my insurance or make self-payment for services provided; and responsibility for payment of non-covered services.     Patient would like the video invitation sent by:  My Chart    Mode of Communication:  Video Conference via [employer]well    Distant Location (Provider):  On-site    As the provider I attest to compliance with applicable laws and regulations related to telemedicine.  Visit Start Time: 10:05 AM  Visit End Time:  10:27 AM   VISIT NUMBER: 2  [facility] [place] Visit Activities: [facility] [place] Only     Assessments completed prior to visit:     The following assessments were completed by patient for this visit:  PHQ9:       7/15/2013     4:15 PM 2024     3:14 PM 2025     3:43 PM 3/4/2025    10:50 AM   PHQ-9 SCORE   PHQ-9 Total Score 2      PHQ-9 Total Score [facility]   2 (Minimal depression) 9 (Mild depression)   PHQ-9 Total Score  2 2  9        Patient-reported     GAD7:        No data to display              CAGE-AID:       3/4/2025    10:51 AM    CAGE-AID Total Score   Total Score 0    Total Score MyChart 0 (A total score of 2 or greater is considered clinically significant)       Patient-reported     PROMIS 10-Global Health (all questions and answers displayed):       3/13/2025     9:44 AM   PROMIS 10   In general, would you say your health is: Good   In general, would you say your quality of life is: Very good   In general, how would you rate your physical health? Good   In general, how would you rate your mental health, including your mood and your ability to think? Very good   In general, how would you rate your satisfaction with your social activities and relationships? Very good   In general, please rate how well you carry out your usual social activities and roles Very good   To what extent are you able to carry out your everyday physical activities such as walking, climbing stairs, carrying groceries, or moving a chair? Completely   In the past 7 days, how often have you been bothered by emotional problems such as feeling anxious, depressed, or irritable? Rarely   In the past 7 days, how would you rate your fatigue on average? Moderate   In the past 7 days, how would you rate your pain on average, where 0 means no pain, and 10 means worst imaginable pain? 2   In general, would you say your health is: 3   In general, would you say your quality of life is: 4   In general, how would you rate your physical health? 3   In general, how would you rate your mental health, including your mood and your ability to think? 4   In general, how would you rate your satisfaction with your social activities and relationships? 4   In general, please rate how well you carry out your usual social activities and roles. (This includes activities at home, at work and in your community, and responsibilities as a parent, child, spouse, employee, friend, etc.) 4   To what extent are you able to carry out your everyday physical activities such as walking, climbing stairs,  carrying groceries, or moving a chair? 5   In the past 7 days, how often have you been bothered by emotional problems such as feeling anxious, depressed, or irritable? 2   In the past 7 days, how would you rate your fatigue on average? 3   In the past 7 days, how would you rate your pain on average, where 0 means no pain, and 10 means worst imaginable pain? 2   Global Mental Health Score 16    Global Physical Health Score 15    PROMIS TOTAL - SUBSCORES 31        Patient-reported     [place] Suicide Severity Rating Scale (Lifetime/Recent)      2/4/2025     1:07 PM 3/4/2025    11:31 AM   [place] Suicide Severity Rating (Lifetime/Recent)   Q1 Wished to be Dead (Past Month) 0-->no    Q2 Suicidal Thoughts (Past Month) 0-->no    Q6 Suicide Behavior (Lifetime) 0-->no    Level of Risk per Screen no risks indicated    1. Wish to be Dead (Lifetime)  N   1. Wish to be Dead (Past 1 Month)  N   2. Non-Specific Active Suicidal Thoughts (Lifetime)  N   2. Non-Specific Active Suicidal Thoughts (Past 1 Month)  N   3. Active Suicidal Ideation with any Methods (Not Plan) Without Intent to Act (Lifetime)  N   3. Active Suicidal Ideation with any Methods (Not Plan) Without Intent to Act (Past 1 Month)  N   4. Active Suicidal Ideation with Some Intent to Act, Without Specific Plan (Lifetime)  N   4. Active Suicidal Ideation with Some Intent to Act, Without Specific Plan (Past 1 Month)  N   5. Active Suicidal Ideation with Specific Plan and Intent (Lifetime)  N   5. Active Suicidal Ideation with Specific Plan and Intent (Past 1 Month)  N   Actual Attempt (Lifetime)  N   Has subject engaged in non-suicidal self-injurious behavior? (Lifetime)  N   Interrupted Attempts (Lifetime)  N   Aborted or Self-Interrupted Attempt (Lifetime)  N   Preparatory Acts or Behavior (Lifetime)  N   Calculated C-SSRS Risk Score (Lifetime/Recent)  No Risk Indicated        Identifying Information:     Patient is a 35 year old female,     ,    adult.  Patient attended the session alone.         Referral:   Who referred you to care: referring provider,  .    Reason for referral: clarify behavioral health diagnosis.        Patient's Statement of Presenting Concern:     Patient reports the following reason(s) for seeking an assessment at this time: Postpardum anxiety .  Patient stated that symptoms have resulted in the following functional impairments: self-care     History of Presenting Concern:     Patient reports that these problem(s) began 02/12/25  . Patient has not attempted to resolve these concerns in the past. Patient reports that other professional(s) are not involved in providing support / services.      Social/Family History:     The patient describes their cultural background as ,      Cultural influences and impact on patient's life structure, values, norms, and healthcare: N/A .      Contextual influences on patient's health include: NA.      Cultural, Contextual, and socioeconomic factors do not affect the patient's access to services.  These factors will be addressed in the Preliminary Treatment plan.    Patient identified their preferred language to be English,  . Patient reported they do not  need the assistance of an  or other support involved in therapy.      Current living situation: staying in own home/apartment, , , , ,yes,Isis,1 month,Daughter,yes with  and daughter       Socioeconomic status and needs: does not have financial concerns.     Patient's current significant relationships include: partner; parents; friends,      Patient's sexual orientation is heterosexual,     Patient reported having 1  children.      Patient identified some stable and meaningful social connections.      Patient identified the following strengths or resources that will help her: help seeking, strong support system    Highest education level was college graduate,  .      Patient is currently employed fulltime .     Patient  reported that they have not  been involved with the legal system. Do you have a probation office? Patient  denies being on probation / parole / under the jurisdiction of the court       Medical History:    Family History of Mental Health: No    Current Medical Health Concerns: None reported    Current Medication:    Patient reports not taking any current medications     Medication Adherence:     Patient reports taking/not taking prescription medications as prescribed: not currently prescribed .     Substance Use:      Patient reports current alcohol use, not daily  Patient denies using tobacco  Patient denies using cannabis.     Patient reports using/abusing the following substance(s). Patient denies any history of substance use.      Substance Use: No symptoms     Based on the negative CAGE score and clinical interview there  are not indications of drug or alcohol abuse.        Significant Losses / Trauma / Abuse / Neglect Issues:     There are no indications or report of: significant losses, trauma, abuse or neglect.   Issues of possible neglect are not present.           Mental Status Assessment:     Appearance:                            Appropriate    Eye Contact:                           Good    Psychomotor Behavior:          Normal    Attitude:                                   Friendly Pleasant   Orientation:                             All   Speech Rate / Production:      Normal/ Responsive   Volume:                                   Normal    Mood:                                      Anxious  Sad    Affect:                                      Appropriate    Thought Content:                    Clear    Thought Form:                        Goal Directed    Insight:                                     Good             Safety Assessment:     Patient denies a history of suicidal ideation, suicide attempts, self-injurious behavior, homicidal ideation, homicidal behavior, and and other safety concerns    Patient denies current fears or concerns for personal safety.   Patient denies current or recent suicidal ideation or behaviors.   Patient denies current or recent homicidal ideation or behaviors.   Patient denies current or recent self injurious behavior or ideation.   Patient denies other safety concerns.   Patient reports there are/are not firearms in the house  are;yes, they are secured.    Protective Factors forward or future oriented thinking; dedication to family or friends; safe and stable environment; regular sleep; effectively controls impulses; regular physical activity; sense of belonging; purpose; secure attachment; help seeking behaviors when distressed; structured day; sense of meaning; positive social skills;       Risk Factors Lack of sleep    Plan for Safety and Risk Management:     Safety and Risk: Recommended that patient call 911 or go to the local ED should there be a change in any of these risk factors.          Report to child / adult protection services was NA.        Diagnostic Criteria:     Adjustment Disorder  A. The development of emotional or behavioral symptoms in response to an identifiable stressor(s) occurring within 3 months of the onset of the stressor(s)  B. These symptoms or behaviors are clinically significant, as evidenced by one or both of the following:  C. The stress-related disturbance does not meet criteria for another disorder & is not not an exacerbation of another mental disorder  D. The symptoms do not represent normal bereavement  E. Once the stressor or its consequences have terminated, the symptoms do not persist for more than an additional 6 months       * Adjustment Disorder with Anxiety: The predominant manfestations are symptoms such as nervousness, worry, or jitteriness, or, in children separation anxiety from major attachment figures     DSM5 Diagnoses:      Diagnoses: Adjustment Disorders  309.24 (F43.22) With anxiety   Psychosocial / Contextual Factors:   postpartum anxiety        Preliminary Treatment Plan:     It is expected that patient will need less than 10 psychotherapy sessions in the next 12 months, as they have received less than 10 in the previous year.     Chemical dependency recommendations: No indications of CD issues     As a preliminary treatment goal, patient will experience a reduction in anxiety, will develop more effective coping skills to manage anxiety symptoms, and will develop healthy cognitive patterns and beliefs.     Collaboration with other professionals is not indicated at this time.     Referral to another professional/service is not indicated at this time..     A Release of Information is not needed at this time.             Linda Conrad, [facility]   March 13, 2025

## 2025-05-07 ENCOUNTER — VIRTUAL VISIT (OUTPATIENT)
Dept: BEHAVIORAL HEALTH | Facility: CLINIC | Age: 35
End: 2025-05-07
Payer: COMMERCIAL

## 2025-05-07 DIAGNOSIS — F43.22 ADJUSTMENT DISORDER WITH ANXIETY: Primary | ICD-10-CM

## 2025-05-07 NOTE — PROGRESS NOTES
[facility]: Integrated Behavioral Health    Behavioral Health Clinician Progress Note   Mental Health & Addiction Services      Wednesday May 07, 2025    Patient Name: America Pastor       Service Type:  Individual   Service Location:  MyChart / Email (patient reached)   Visit Start Time: 3:18 PM  Visit End Time:  4:02 PM   Session Length: 16 - 37    Attendees: Client   Service Modality: Video Visit:      Provider verified identity through the following two step process.  Patient provided:  Patient is known previously to provider    Telemedicine Visit: The patient's condition can be safely assessed and treated via synchronous audio and visual telemedicine encounter.      Reason for Telemedicine Visit: Patient has requested telehealth visit    Originating Site (Patient Location): Patient's home    Distant Site (Provider Location): [facility]    Consent:  The patient/guardian has verbally consented to: the potential risks and benefits of telemedicine (video visit) versus in person care; bill my insurance or make self-payment for services provided; and responsibility for payment of non-covered services.     Patient would like the video invitation sent by:  My Chart    Mode of Communication:  [facility] Conference [facility]    Distant Location (Provider):  On-site    As the provider I attest to compliance with applicable laws and regulations related to telemedicine.   Visit number: 3    [facility] Visit Activities (Refresh list every visit): [facility] Only     Date of Brief Diagnostic Assessment : 3/13/25  Treatment Plan Review Date: NA      DATA:    Extended Session (60+ minutes): No   Interactive Complexity: No   Crisis: No   [facility] Patient: No     Assessments completed:  The following assessments were completed by patient for this visit:  PHQ9:       7/15/2013     4:15 PM 8/5/2024     3:14 PM 1/7/2025     3:43 PM 3/4/2025    10:50 AM 3/21/2025    10:38 AM   PHQ-9 SCORE   PHQ-9  Total Score 2       PHQ-9 Total Score MyChart   2 (Minimal depression) 9 (Mild depression) 3 (Minimal depression)   PHQ-9 Total Score  2 2  9  3        Patient-reported     GAD7:        No data to display              CAGE-AID:       3/4/2025    10:51 AM   CAGE-AID Total Score   Total Score 0    Total Score MyChart 0 (A total score of 2 or greater is considered clinically significant)       Patient-reported     PROMIS 10-Global Health (all questions and answers displayed):       3/13/2025     9:44 AM   PROMIS 10   In general, would you say your health is: Good   In general, would you say your quality of life is: Very good   In general, how would you rate your physical health? Good   In general, how would you rate your mental health, including your mood and your ability to think? Very good   In general, how would you rate your satisfaction with your social activities and relationships? Very good   In general, please rate how well you carry out your usual social activities and roles Very good   To what extent are you able to carry out your everyday physical activities such as walking, climbing stairs, carrying groceries, or moving a chair? Completely   In the past 7 days, how often have you been bothered by emotional problems such as feeling anxious, depressed, or irritable? Rarely   In the past 7 days, how would you rate your fatigue on average? Moderate   In the past 7 days, how would you rate your pain on average, where 0 means no pain, and 10 means worst imaginable pain? 2   In general, would you say your health is: 3   In general, would you say your quality of life is: 4   In general, how would you rate your physical health? 3   In general, how would you rate your mental health, including your mood and your ability to think? 4   In general, how would you rate your satisfaction with your social activities and relationships? 4   In general, please rate how well you carry out your usual social activities and roles.  (This includes activities at home, at work and in your community, and responsibilities as a parent, child, spouse, employee, friend, etc.) 4   To what extent are you able to carry out your everyday physical activities such as walking, climbing stairs, carrying groceries, or moving a chair? 5   In the past 7 days, how often have you been bothered by emotional problems such as feeling anxious, depressed, or irritable? 2   In the past 7 days, how would you rate your fatigue on average? 3   In the past 7 days, how would you rate your pain on average, where 0 means no pain, and 10 means worst imaginable pain? 2   Global Mental Health Score 16    Global Physical Health Score 15    PROMIS TOTAL - SUBSCORES 31        Patient-reported     [place] Suicide Severity Rating Scale (Lifetime/Recent)      2/4/2025     1:07 PM 3/4/2025    11:31 AM   [place] Suicide Severity Rating (Lifetime/Recent)   Q1 Wished to be Dead (Past Month) 0-->no    Q2 Suicidal Thoughts (Past Month) 0-->no    Q6 Suicide Behavior (Lifetime) 0-->no    Level of Risk per Screen no risks indicated    1. Wish to be Dead (Lifetime)  N   1. Wish to be Dead (Past 1 Month)  N   2. Non-Specific Active Suicidal Thoughts (Lifetime)  N   2. Non-Specific Active Suicidal Thoughts (Past 1 Month)  N   3. Active Suicidal Ideation with any Methods (Not Plan) Without Intent to Act (Lifetime)  N   3. Active Suicidal Ideation with any Methods (Not Plan) Without Intent to Act (Past 1 Month)  N   4. Active Suicidal Ideation with Some Intent to Act, Without Specific Plan (Lifetime)  N   4. Active Suicidal Ideation with Some Intent to Act, Without Specific Plan (Past 1 Month)  N   5. Active Suicidal Ideation with Specific Plan and Intent (Lifetime)  N   5. Active Suicidal Ideation with Specific Plan and Intent (Past 1 Month)  N   Actual Attempt (Lifetime)  N   Has subject engaged in non-suicidal self-injurious behavior? (Lifetime)  N   Interrupted Attempts (Lifetime)  N   Aborted  or Self-Interrupted Attempt (Lifetime)  N   Preparatory Acts or Behavior (Lifetime)  N   Calculated C-SSRS Risk Score (Lifetime/Recent)  No Risk Indicated        Reason for Visit/Presenting Concern:  Anxiety    Current Stressors / Issues:   Pt reporting general anxiety, inability to let things go and perseverating on things. Discussed situations with in-laws and ways that they interact with baby that make pt nervous.  Discussed distinguishing between what is a boundary that needs to be set vs. What is something that is driven by anxiety.  MIL and pt's mom will be watching baby 2x/wk when pt goes back to work.  Setting boundaries with MIL is harder than with her own mom.      Therapeutic Interventions:  Motivational Interviewing (MI): Validated patient's thoughts, feelings and experience. Expressed respect for patient's autonomy in decision making.  Asked open-ended questions to invite patient's self-reflection and self-direction around change and what is important for them in working towards their goals.  Expressed and demonstrated empathy through reflective listening.  Explored discrepancy between patient's values and current state.  Cognitive Behavioral Therapy (CBT): Provided psycho-education on cognitive distortions., Identified and challenged maladaptive patterns of behavior and thinking that interfere with patients life, Identified behavioral changes that can be made to move towards treatment goals. , and Assisted patient in developing coping strategies (e.g. more physical exercise, less internal focus, increase social involvement, more assertiveness, etc.).    Response to treatment interventions:   Patient was receptive to interventions utilized.  Patient was engaged in the therapy process.       Progress on Treatment Objective(s) / Homework:   Satisfactory progress - ACTION (Actively working towards change); Intervened by reinforcing change plan / affirming steps taken     Medication Review:   No current  psychiatric medications prescribed     Medication Compliance:   NA     Chemical Use Review:  Substance Use: Chemical use reviewed, no active concerns identified      Tobacco Use: No current tobacco use.       Assessment: Current Emotional / Mental Status (status of significant symptoms):      Risk status (Self / Other harm or suicidal ideation)   Patient denies a history of suicidal ideation, suicide attempts, self-injurious behavior, homicidal ideation, homicidal behavior, and and other safety concerns   Patient denies current fears or concerns for personal safety.   Patient denies current or recent suicidal ideation or behaviors.   Patient denies current or recent homicidal ideation or behaviors.   Patient denies current or recent self injurious behavior or ideation.   Patient denies other safety concerns.   Recommended that patient call 911 or go to the local ED should there be a change in any of these risk factors       ASSESSMENT:   Mental Status:     Appearance:                            Appropriate    Eye Contact:                           Good    Psychomotor Behavior:          Normal    Attitude:                                   Friendly Pleasant   Orientation:                             All   Speech Rate / Production:      Normal/ Responsive   Volume:                                   Normal    Mood:                                      Anxious  Sad    Affect:                                      Appropriate    Thought Content:                    Clear    Thought Form:                        Goal Directed    Insight:                                     Good          Diagnoses:   Adjustment Disorders  309.24 (F43.22) With anxiety    Collateral Reports Completed:   Not Applicable       Plan: (Homework, other):   Patient was provided No indications of CD issues  Patient was given information about behavioral services and encouraged to schedule a follow up appointment with the clinic [facility] in 2 weeks.          Linda Conrad, [facility]     _____________________________________________________________________________________________________________________________________                                              Individual Treatment Plan    Patient's Name: America Pastor   YOB: 1990  Date of Creation: 5/7/25  Date Treatment Plan Last Reviewed/Revised: NA    DSM5 Diagnoses: Adjustment Disorders  309.24 (F43.22) With anxiety  Psychosocial / Contextual Factors: postpartum anxiety  PROMIS (reviewed every 90 days):   The following assessments were completed by patient for this visit:  PHQ9:       7/15/2013     4:15 PM 8/5/2024     3:14 PM 1/7/2025     3:43 PM 3/4/2025    10:50 AM 3/21/2025    10:38 AM   PHQ-9 SCORE   PHQ-9 Total Score 2       PHQ-9 Total Score MyChart   2 (Minimal depression) 9 (Mild depression) 3 (Minimal depression)   PHQ-9 Total Score  2 2  9  3        Patient-reported     GAD7:        No data to display              CAGE-AID:       3/4/2025    10:51 AM   CAGE-AID Total Score   Total Score 0    Total Score MyChart 0 (A total score of 2 or greater is considered clinically significant)       Patient-reported     PROMIS 10-Global Health (all questions and answers displayed):       3/13/2025     9:44 AM   PROMIS 10   In general, would you say your health is: Good   In general, would you say your quality of life is: Very good   In general, how would you rate your physical health? Good   In general, how would you rate your mental health, including your mood and your ability to think? Very good   In general, how would you rate your satisfaction with your social activities and relationships? Very good   In general, please rate how well you carry out your usual social activities and roles Very good   To what extent are you able to carry out your everyday physical activities such as walking, climbing stairs, carrying groceries, or moving a chair? Completely   In the past 7 days, how often have you  been bothered by emotional problems such as feeling anxious, depressed, or irritable? Rarely   In the past 7 days, how would you rate your fatigue on average? Moderate   In the past 7 days, how would you rate your pain on average, where 0 means no pain, and 10 means worst imaginable pain? 2   In general, would you say your health is: 3   In general, would you say your quality of life is: 4   In general, how would you rate your physical health? 3   In general, how would you rate your mental health, including your mood and your ability to think? 4   In general, how would you rate your satisfaction with your social activities and relationships? 4   In general, please rate how well you carry out your usual social activities and roles. (This includes activities at home, at work and in your community, and responsibilities as a parent, child, spouse, employee, friend, etc.) 4   To what extent are you able to carry out your everyday physical activities such as walking, climbing stairs, carrying groceries, or moving a chair? 5   In the past 7 days, how often have you been bothered by emotional problems such as feeling anxious, depressed, or irritable? 2   In the past 7 days, how would you rate your fatigue on average? 3   In the past 7 days, how would you rate your pain on average, where 0 means no pain, and 10 means worst imaginable pain? 2   Global Mental Health Score 16    Global Physical Health Score 15    PROMIS TOTAL - SUBSCORES 31        Patient-reported     [place] Suicide Severity Rating Scale (Lifetime/Recent)      2/4/2025     1:07 PM 3/4/2025    11:31 AM   [place] Suicide Severity Rating (Lifetime/Recent)   Q1 Wished to be Dead (Past Month) 0-->no    Q2 Suicidal Thoughts (Past Month) 0-->no    Q6 Suicide Behavior (Lifetime) 0-->no    Level of Risk per Screen no risks indicated    1. Wish to be Dead (Lifetime)  N   1. Wish to be Dead (Past 1 Month)  N   2. Non-Specific Active Suicidal Thoughts (Lifetime)  N   2.  Non-Specific Active Suicidal Thoughts (Past 1 Month)  N   3. Active Suicidal Ideation with any Methods (Not Plan) Without Intent to Act (Lifetime)  N   3. Active Suicidal Ideation with any Methods (Not Plan) Without Intent to Act (Past 1 Month)  N   4. Active Suicidal Ideation with Some Intent to Act, Without Specific Plan (Lifetime)  N   4. Active Suicidal Ideation with Some Intent to Act, Without Specific Plan (Past 1 Month)  N   5. Active Suicidal Ideation with Specific Plan and Intent (Lifetime)  N   5. Active Suicidal Ideation with Specific Plan and Intent (Past 1 Month)  N   Actual Attempt (Lifetime)  N   Has subject engaged in non-suicidal self-injurious behavior? (Lifetime)  N   Interrupted Attempts (Lifetime)  N   Aborted or Self-Interrupted Attempt (Lifetime)  N   Preparatory Acts or Behavior (Lifetime)  N   Calculated C-SSRS Risk Score (Lifetime/Recent)  No Risk Indicated        Referral / Collaboration:  Referral to another professional/service is not indicated at this time..    Anticipated number of session for this episode of care:  9-12 sessions  Anticipation frequency of session: Biweekly  Anticipated Duration of each session: 16-37 minutes  Treatment plan will be reviewed in 90 days or when goals have been changed.       MeasurableTreatment Goal(s) related to diagnosis / functional impairment(s)  Goal 1: Patient will improve management of anxiety symptoms as evidenced by reduced frequency, intensity and duration of anxiety symptoms. learn and implement coping skills that result in a reduction of anxiety and nervousness. improve daily functioning. recognize contributing factors of anxiety and identify ways to intervene.    I will know I've met my goal when I can determine when I'm acting out of anxiety/fear and utilize coping skills.      Status: New - Date: 5/8/25     Intervention(s)  [facility] will Cognitive Behavioral Therapy (CBT): Provide psycho-education on cognitive distortions., Identify and  challenge maladaptive patterns of behavior and thinking that interfere with patient's life, Identify behavioral changes that can be made to move towards treatment goals. , Assist patient in developing coping strategies (e.g. more physical exercise, less internal focus, increase social involvement, more assertiveness, etc.)., and Reinforce successes reported by patient since last appointment.           Patient has reviewed and agreed to the above plan.     Written by  KARLO Pathak

## 2025-05-20 ENCOUNTER — ANCILLARY PROCEDURE (OUTPATIENT)
Dept: ULTRASOUND IMAGING | Facility: CLINIC | Age: 35
End: 2025-05-20
Attending: ADVANCED PRACTICE MIDWIFE
Payer: COMMERCIAL

## 2025-05-20 DIAGNOSIS — N93.9 VAGINAL BLEEDING: ICD-10-CM

## 2025-05-20 PROCEDURE — 76830 TRANSVAGINAL US NON-OB: CPT | Performed by: OBSTETRICS & GYNECOLOGY

## 2025-05-21 ENCOUNTER — VIRTUAL VISIT (OUTPATIENT)
Dept: MIDWIFE SERVICES | Facility: CLINIC | Age: 35
End: 2025-05-21
Payer: COMMERCIAL

## 2025-05-21 ENCOUNTER — VIRTUAL VISIT (OUTPATIENT)
Dept: BEHAVIORAL HEALTH | Facility: CLINIC | Age: 35
End: 2025-05-21
Payer: COMMERCIAL

## 2025-05-21 DIAGNOSIS — F43.22 ADJUSTMENT DISORDER WITH ANXIETY: Primary | ICD-10-CM

## 2025-05-21 PROCEDURE — 90832 PSYTX W PT 30 MINUTES: CPT | Mod: 95 | Performed by: SOCIAL WORKER

## 2025-05-21 NOTE — PROGRESS NOTES
Telemedicine Visit: The patient's condition can be safely assessed and treated via synchronous audio and visual telemedicine encounter.      Reason for Telemedicine Visit: Patient has requested telehealth visit    Originating Site (Patient Location): Patient's home      Distant Location (provider location):  On-site    Consent:  The patient/guardian has verbally consented to: the potential risks and benefits of telemedicine (video visit) versus in person care; bill my insurance or make self-payment for services provided; and responsibility for payment of non-covered services.     Mode of Communication:  Video Conference via [employer] and unable to connect. Video visit was ended and patient was contacted by telephone.    As the provider I attest to compliance with applicable laws and regulations related to telemedicine.     S:  America Pastor is a 35 year old who is scheduled for a virtual appointment to review her ultrasound results from yesterday. She had a pelvic ultrasound r/t ongoing episodes of spotting and bleeding after her vaginal delivery 14 wks ago. She is not currently bleeding       O:   Vitals not taken. Telephone encounter.   US Transvaginal Pelvic Non-OB  Order #: [id number] Accession #: [id number]  Study Notes      Aman Williamson on 5/20/2025 11:01 AM      Gynecological Ultrasound Report  Pelvic U/S - Transvaginal   [facility]  Referring Provider: Casimiro Melgoza CNM  Sonographer:  Aman Williamson RDMS  Indication: Bleeding at 14 weeks postpartum, had manual placental removal, vaginal bleeding  LMP: No LMP recorded. (Menstrual Status: Recent pregnancy)  History:   Gynecological Ultrasonography:   Uterus: anteverted. Contour is irregular w/ myomata: 1 Right Posterior 0.9 x 0.8 x 1.2 cm and 2 Right Submucosal 2.0 x 1.9 x 2.1 cm.  Size: 7.2 x 4.3 x 3.7 cm  Endometrium: Thickness Total 6.6 mm  Findings: Fibroids visualized  Right Ovary: 2.0 x 2.7 x  2.8 cm. Multiple follicles vs cysts  Left Ovary: 2.3 x 2.6 x 1.8 cm. Multiple follicles vs cysts  Cul de Sac Free Fluid: No free fluid  Technique: Transvaginal Imaging performed  3D imaging performed     Impression:         The ovaries were visualized and no abnormalities were seen. No retained products of conception seen. Fibroid 2.0 x 1.9 x 2.1 appears to be submucosal and adjacent to the endometrial cavity.  A smaller intramural fibroid also seen.  Recommend clinical correlation.      TAMEKA OWENS MD     A/P: (O72.1) Abnormal uterine bleeding, postpartum  (primary encounter diagnosis)  Comment: ultrasound shows no obvious pathology  Plan: Discussed that this irregular episodic bleeding is likely to do with postpartum hypestrogenic effect. Reassurance given and will likely resolve as estrogen levels increase after breastfeeding or when she gets farther from her delivery.     25 minutes spent on phone call and chart review w/ patient.      Shakila López, BERTHA, APRN CNM          .

## 2025-05-21 NOTE — PROGRESS NOTES
[facility]: Integrated Behavioral Health    Behavioral Health Clinician Progress Note   Mental Health & Addiction Services      Wednesday May 21, 2025    Patient Name: America Pastor       Service Type:  Individual   Service Location:  MyChart / Email (patient reached)   Visit Start Time: 9:05 AM  Visit End Time:  9:34 AM   Session Length: 16 - 37    Attendees: Client   Service Modality: Video Visit:      Provider verified identity through the following two step process.  Patient provided:  Patient is known previously to provider    Telemedicine Visit: The patient's condition can be safely assessed and treated via synchronous audio and visual telemedicine encounter.      Reason for Telemedicine Visit: Patient has requested telehealth visit    Originating Site (Patient Location): Patient's home    Distant Site (Provider Location): [facility] – [place], [place]    Consent:  The patient/guardian has verbally consented to: the potential risks and benefits of telemedicine (video visit) versus in person care; bill my insurance or make self-payment for services provided; and responsibility for payment of non-covered services.     Patient would like the video invitation sent by:  My Chart    Mode of Communication:  [facility]    Distant Location (Provider):  On-site    As the provider I attest to compliance with applicable laws and regulations related to telemedicine.   Visit number: 4    [facility] Visit Activities (Refresh list every visit): [facility] Only     Date of Brief Diagnostic Assessment : 3/13/25  Treatment Plan Review Date: NA      DATA:    Extended Session (60+ minutes): No   Interactive Complexity: No   Crisis: No   [facility] Patient: No     Assessments completed:  The following assessments were completed by patient for this visit:  PHQ9:       7/15/2013     4:15 PM 8/5/2024     3:14 PM 1/7/2025     3:43 PM 3/4/2025    10:50 AM 3/21/2025    10:38 AM   PHQ-9 SCORE   PHQ-9  Total Score 2       PHQ-9 Total Score MyChart   2 (Minimal depression) 9 (Mild depression) 3 (Minimal depression)   PHQ-9 Total Score  2 2  9  3        Patient-reported     GAD7:        No data to display              CAGE-AID:       3/4/2025    10:51 AM   CAGE-AID Total Score   Total Score 0    Total Score MyChart 0 (A total score of 2 or greater is considered clinically significant)       Patient-reported     PROMIS 10-Global Health (all questions and answers displayed):       3/13/2025     9:44 AM   PROMIS 10   In general, would you say your health is: Good   In general, would you say your quality of life is: Very good   In general, how would you rate your physical health? Good   In general, how would you rate your mental health, including your mood and your ability to think? Very good   In general, how would you rate your satisfaction with your social activities and relationships? Very good   In general, please rate how well you carry out your usual social activities and roles Very good   To what extent are you able to carry out your everyday physical activities such as walking, climbing stairs, carrying groceries, or moving a chair? Completely   In the past 7 days, how often have you been bothered by emotional problems such as feeling anxious, depressed, or irritable? Rarely   In the past 7 days, how would you rate your fatigue on average? Moderate   In the past 7 days, how would you rate your pain on average, where 0 means no pain, and 10 means worst imaginable pain? 2   In general, would you say your health is: 3   In general, would you say your quality of life is: 4   In general, how would you rate your physical health? 3   In general, how would you rate your mental health, including your mood and your ability to think? 4   In general, how would you rate your satisfaction with your social activities and relationships? 4   In general, please rate how well you carry out your usual social activities and roles.  (This includes activities at home, at work and in your community, and responsibilities as a parent, child, spouse, employee, friend, etc.) 4   To what extent are you able to carry out your everyday physical activities such as walking, climbing stairs, carrying groceries, or moving a chair? 5   In the past 7 days, how often have you been bothered by emotional problems such as feeling anxious, depressed, or irritable? 2   In the past 7 days, how would you rate your fatigue on average? 3   In the past 7 days, how would you rate your pain on average, where 0 means no pain, and 10 means worst imaginable pain? 2   Global Mental Health Score 16    Global Physical Health Score 15    PROMIS TOTAL - SUBSCORES 31        Patient-reported     [place] Suicide Severity Rating Scale (Lifetime/Recent)      2/4/2025     1:07 PM 3/4/2025    11:31 AM   [place] Suicide Severity Rating (Lifetime/Recent)   Q1 Wished to be Dead (Past Month) 0-->no    Q2 Suicidal Thoughts (Past Month) 0-->no    Q6 Suicide Behavior (Lifetime) 0-->no    Level of Risk per Screen no risks indicated    1. Wish to be Dead (Lifetime)  N   1. Wish to be Dead (Past 1 Month)  N   2. Non-Specific Active Suicidal Thoughts (Lifetime)  N   2. Non-Specific Active Suicidal Thoughts (Past 1 Month)  N   3. Active Suicidal Ideation with any Methods (Not Plan) Without Intent to Act (Lifetime)  N   3. Active Suicidal Ideation with any Methods (Not Plan) Without Intent to Act (Past 1 Month)  N   4. Active Suicidal Ideation with Some Intent to Act, Without Specific Plan (Lifetime)  N   4. Active Suicidal Ideation with Some Intent to Act, Without Specific Plan (Past 1 Month)  N   5. Active Suicidal Ideation with Specific Plan and Intent (Lifetime)  N   5. Active Suicidal Ideation with Specific Plan and Intent (Past 1 Month)  N   Actual Attempt (Lifetime)  N   Has subject engaged in non-suicidal self-injurious behavior? (Lifetime)  N   Interrupted Attempts (Lifetime)  N   Aborted  or Self-Interrupted Attempt (Lifetime)  N   Preparatory Acts or Behavior (Lifetime)  N   Calculated C-SSRS Risk Score (Lifetime/Recent)  No Risk Indicated        Reason for Visit/Presenting Concern:  Anxiety    Current Stressors / Issues:     Overall doing ok, some ups and downs, going back to work next week.  Pt has still been bleeding since birth, placenta was manually removed.  Pt is seeing Shakila, mid wife today about next steps.  Baby in  M,W,F. Pt has been better able to distinguish between anxiety and what boundaries she is wanting to set.   Pt works at [employer] and as of July 1st, local employees need to come into the office 1x/wk.  This doesn't make sense for her given that she is on zoom all day and will need to be pumping.  Discussed talking with HR and advocating for accommodations. Pt reached out to cousin whom she is upset with and asked to talk, will report back at next session how that conversation went.     Therapeutic Interventions:  Motivational Interviewing (MI): Validated patient's thoughts, feelings and experience. Expressed respect for patient's autonomy in decision making.  Asked open-ended questions to invite patient's self-reflection and self-direction around change and what is important for them in working towards their goals.  Expressed and demonstrated empathy through reflective listening.  Explored discrepancy between patient's values and current state.  Cognitive Behavioral Therapy (CBT): Provided psycho-education on cognitive distortions., Identified and challenged maladaptive patterns of behavior and thinking that interfere with patients life, Identified behavioral changes that can be made to move towards treatment goals. , and Assisted patient in developing coping strategies (e.g. more physical exercise, less internal focus, increase social involvement, more assertiveness, etc.).    Response to treatment interventions:   Patient was receptive to interventions utilized.   Patient was engaged in the therapy process.       Progress on Treatment Objective(s) / Homework:   Satisfactory progress - ACTION (Actively working towards change); Intervened by reinforcing change plan / affirming steps taken     Medication Review:   No current psychiatric medications prescribed     Medication Compliance:   NA     Chemical Use Review:  Substance Use: Chemical use reviewed, no active concerns identified      Tobacco Use: No current tobacco use.       Assessment: Current Emotional / Mental Status (status of significant symptoms):      Risk status (Self / Other harm or suicidal ideation)   Patient denies a history of suicidal ideation, suicide attempts, self-injurious behavior, homicidal ideation, homicidal behavior, and and other safety concerns   Patient denies current fears or concerns for personal safety.   Patient denies current or recent suicidal ideation or behaviors.   Patient denies current or recent homicidal ideation or behaviors.   Patient denies current or recent self injurious behavior or ideation.   Patient denies other safety concerns.   Recommended that patient call 911 or go to the local ED should there be a change in any of these risk factors       ASSESSMENT:   Mental Status:     Appearance:                            Appropriate    Eye Contact:                           Good    Psychomotor Behavior:          Normal    Attitude:                                   Friendly Pleasant   Orientation:                             All   Speech Rate / Production:      Normal/ Responsive   Volume:                                   Normal    Mood:                                      Anxious  Sad    Affect:                                      Appropriate    Thought Content:                    Clear    Thought Form:                        Goal Directed    Insight:                                     Good          Diagnoses:   Adjustment Disorders  309.24 (F43.22) With anxiety    Collateral  Reports Completed:   Not Applicable       Plan: (Homework, other):   Patient was provided No indications of CD issues  Patient was given information about behavioral services and encouraged to schedule a follow up appointment with the clinic [facility] [place] in 2 weeks.         KARLO Pathak     _____________________________________________________________________________________________________________________________________                                              Individual Treatment Plan    Patient's Name: America Pastor   YOB: 1990  Date of Creation: 5/7/25  Date Treatment Plan Last Reviewed/Revised: NA    DSM5 Diagnoses: Adjustment Disorders  309.24 (F43.22) With anxiety  Psychosocial / Contextual Factors: postpartum anxiety  PROMIS (reviewed every 90 days):   The following assessments were completed by patient for this visit:  PHQ9:       7/15/2013     4:15 PM 8/5/2024     3:14 PM 1/7/2025     3:43 PM 3/4/2025    10:50 AM 3/21/2025    10:38 AM   PHQ-9 SCORE   PHQ-9 Total Score 2       PHQ-9 Total Score MyChart   2 (Minimal depression) 9 (Mild depression) 3 (Minimal depression)   PHQ-9 Total Score  2 2  9  3        Patient-reported     GAD7:        No data to display              CAGE-AID:       3/4/2025    10:51 AM   CAGE-AID Total Score   Total Score 0    Total Score MyChart 0 (A total score of 2 or greater is considered clinically significant)       Patient-reported     PROMIS 10-Global Health (all questions and answers displayed):       3/13/2025     9:44 AM   PROMIS 10   In general, would you say your health is: Good   In general, would you say your quality of life is: Very good   In general, how would you rate your physical health? Good   In general, how would you rate your mental health, including your mood and your ability to think? Very good   In general, how would you rate your satisfaction with your social activities and relationships? Very good   In general, please rate how well you  carry out your usual social activities and roles Very good   To what extent are you able to carry out your everyday physical activities such as walking, climbing stairs, carrying groceries, or moving a chair? Completely   In the past 7 days, how often have you been bothered by emotional problems such as feeling anxious, depressed, or irritable? Rarely   In the past 7 days, how would you rate your fatigue on average? Moderate   In the past 7 days, how would you rate your pain on average, where 0 means no pain, and 10 means worst imaginable pain? 2   In general, would you say your health is: 3   In general, would you say your quality of life is: 4   In general, how would you rate your physical health? 3   In general, how would you rate your mental health, including your mood and your ability to think? 4   In general, how would you rate your satisfaction with your social activities and relationships? 4   In general, please rate how well you carry out your usual social activities and roles. (This includes activities at home, at work and in your community, and responsibilities as a parent, child, spouse, employee, friend, etc.) 4   To what extent are you able to carry out your everyday physical activities such as walking, climbing stairs, carrying groceries, or moving a chair? 5   In the past 7 days, how often have you been bothered by emotional problems such as feeling anxious, depressed, or irritable? 2   In the past 7 days, how would you rate your fatigue on average? 3   In the past 7 days, how would you rate your pain on average, where 0 means no pain, and 10 means worst imaginable pain? 2   Global Mental Health Score 16    Global Physical Health Score 15    PROMIS TOTAL - SUBSCORES 31        Patient-reported     [place] Suicide Severity Rating Scale (Lifetime/Recent)      2/4/2025     1:07 PM 3/4/2025    11:31 AM   [place] Suicide Severity Rating (Lifetime/Recent)   Q1 Wished to be Dead (Past Month) 0-->no    Q2  Suicidal Thoughts (Past Month) 0-->no    Q6 Suicide Behavior (Lifetime) 0-->no    Level of Risk per Screen no risks indicated    1. Wish to be Dead (Lifetime)  N   1. Wish to be Dead (Past 1 Month)  N   2. Non-Specific Active Suicidal Thoughts (Lifetime)  N   2. Non-Specific Active Suicidal Thoughts (Past 1 Month)  N   3. Active Suicidal Ideation with any Methods (Not Plan) Without Intent to Act (Lifetime)  N   3. Active Suicidal Ideation with any Methods (Not Plan) Without Intent to Act (Past 1 Month)  N   4. Active Suicidal Ideation with Some Intent to Act, Without Specific Plan (Lifetime)  N   4. Active Suicidal Ideation with Some Intent to Act, Without Specific Plan (Past 1 Month)  N   5. Active Suicidal Ideation with Specific Plan and Intent (Lifetime)  N   5. Active Suicidal Ideation with Specific Plan and Intent (Past 1 Month)  N   Actual Attempt (Lifetime)  N   Has subject engaged in non-suicidal self-injurious behavior? (Lifetime)  N   Interrupted Attempts (Lifetime)  N   Aborted or Self-Interrupted Attempt (Lifetime)  N   Preparatory Acts or Behavior (Lifetime)  N   Calculated C-SSRS Risk Score (Lifetime/Recent)  No Risk Indicated        Referral / Collaboration:  Referral to another professional/service is not indicated at this time..    Anticipated number of session for this episode of care:  9-12 sessions  Anticipation frequency of session: Biweekly  Anticipated Duration of each session: 16-37 minutes  Treatment plan will be reviewed in 90 days or when goals have been changed.       MeasurableTreatment Goal(s) related to diagnosis / functional impairment(s)  Goal 1: Patient will improve management of anxiety symptoms as evidenced by reduced frequency, intensity and duration of anxiety symptoms. learn and implement coping skills that result in a reduction of anxiety and nervousness. improve daily functioning. recognize contributing factors of anxiety and identify ways to intervene.    I will know I've  met my goal when I can determine when I'm acting out of anxiety/fear and utilize coping skills.      Status: New - Date: 5/8/25     Intervention(s)  [facility] will Cognitive Behavioral Therapy (CBT): Provide psycho-education on cognitive distortions., Identify and challenge maladaptive patterns of behavior and thinking that interfere with patient's life, Identify behavioral changes that can be made to move towards treatment goals. , Assist patient in developing coping strategies (e.g. more physical exercise, less internal focus, increase social involvement, more assertiveness, etc.)., and Reinforce successes reported by patient since last appointment.           Patient has reviewed and agreed to the above plan.     Written by  KARLO Pathak

## 2025-06-02 ENCOUNTER — VIRTUAL VISIT (OUTPATIENT)
Dept: BEHAVIORAL HEALTH | Facility: CLINIC | Age: 35
End: 2025-06-02
Payer: COMMERCIAL

## 2025-06-02 DIAGNOSIS — F43.22 ADJUSTMENT DISORDER WITH ANXIETY: Primary | ICD-10-CM

## 2025-06-02 PROCEDURE — 90832 PSYTX W PT 30 MINUTES: CPT | Mod: 95 | Performed by: SOCIAL WORKER

## 2025-06-02 NOTE — PROGRESS NOTES
[facility]: Integrated Behavioral Health    Behavioral Health Clinician Progress Note   Mental Health & Addiction Services      June 2, 2025    Patient Name: America Pastor       Service Type:  Individual   Service Location:  MyChart / Email (patient reached)   Visit Start Time: 9:00 AM  Visit End Time:  9: 30 AM   Session Length: 16 - 37    Attendees: Client   Service Modality: Video Visit:      Provider verified identity through the following two step process.  Patient provided:  Patient is known previously to provider    Telemedicine Visit: The patient's condition can be safely assessed and treated via synchronous audio and visual telemedicine encounter.      Reason for Telemedicine Visit: Patient has requested telehealth visit    Originating Site (Patient Location): Patient's home    Distant Site (Provider Location): [facility]    Consent:  The patient/guardian has verbally consented to: the potential risks and benefits of telemedicine (video visit) versus in person care; bill my insurance or make self-payment for services provided; and responsibility for payment of non-covered services.     Patient would like the video invitation sent by:  My Chart    Mode of Communication:  Video Conference via [facility]    Distant Location (Provider):  On-site    As the provider I attest to compliance with applicable laws and regulations related to telemedicine.   Visit number: 5    [facility] Visit Activities (Refresh list every visit): [facility] Only     Date of Brief Diagnostic Assessment : 3/13/25  Treatment Plan Review Date: NA      DATA:    Extended Session (60+ minutes): No   Interactive Complexity: No   Crisis: No   [facility] Patient: No     Assessments completed:  The following assessments were completed by patient for this visit:  PHQ9:       7/15/2013     4:15 PM 8/5/2024     3:14 PM 1/7/2025     3:43 PM 3/4/2025    10:50 AM 3/21/2025    10:38 AM   PHQ-9 SCORE   PHQ-9 Total Score 2        PHQ-9 Total Score MyChart   2 (Minimal depression) 9 (Mild depression) 3 (Minimal depression)   PHQ-9 Total Score  2 2  9  3        Patient-reported     GAD7:        No data to display              CAGE-AID:       3/4/2025    10:51 AM   CAGE-AID Total Score   Total Score 0    Total Score MyChart 0 (A total score of 2 or greater is considered clinically significant)       Patient-reported     PROMIS 10-Global Health (all questions and answers displayed):       3/13/2025     9:44 AM   PROMIS 10   In general, would you say your health is: Good   In general, would you say your quality of life is: Very good   In general, how would you rate your physical health? Good   In general, how would you rate your mental health, including your mood and your ability to think? Very good   In general, how would you rate your satisfaction with your social activities and relationships? Very good   In general, please rate how well you carry out your usual social activities and roles Very good   To what extent are you able to carry out your everyday physical activities such as walking, climbing stairs, carrying groceries, or moving a chair? Completely   In the past 7 days, how often have you been bothered by emotional problems such as feeling anxious, depressed, or irritable? Rarely   In the past 7 days, how would you rate your fatigue on average? Moderate   In the past 7 days, how would you rate your pain on average, where 0 means no pain, and 10 means worst imaginable pain? 2   In general, would you say your health is: 3   In general, would you say your quality of life is: 4   In general, how would you rate your physical health? 3   In general, how would you rate your mental health, including your mood and your ability to think? 4   In general, how would you rate your satisfaction with your social activities and relationships? 4   In general, please rate how well you carry out your usual social activities and roles. (This includes  activities at home, at work and in your community, and responsibilities as a parent, child, spouse, employee, friend, etc.) 4   To what extent are you able to carry out your everyday physical activities such as walking, climbing stairs, carrying groceries, or moving a chair? 5   In the past 7 days, how often have you been bothered by emotional problems such as feeling anxious, depressed, or irritable? 2   In the past 7 days, how would you rate your fatigue on average? 3   In the past 7 days, how would you rate your pain on average, where 0 means no pain, and 10 means worst imaginable pain? 2   Global Mental Health Score 16    Global Physical Health Score 15    PROMIS TOTAL - SUBSCORES 31        Patient-reported     [place] Suicide Severity Rating Scale (Lifetime/Recent)      2/4/2025     1:07 PM 3/4/2025    11:31 AM   [place] Suicide Severity Rating (Lifetime/Recent)   Q1 Wished to be Dead (Past Month) 0-->no    Q2 Suicidal Thoughts (Past Month) 0-->no    Q6 Suicide Behavior (Lifetime) 0-->no    Level of Risk per Screen no risks indicated    1. Wish to be Dead (Lifetime)  N   1. Wish to be Dead (Past 1 Month)  N   2. Non-Specific Active Suicidal Thoughts (Lifetime)  N   2. Non-Specific Active Suicidal Thoughts (Past 1 Month)  N   3. Active Suicidal Ideation with any Methods (Not Plan) Without Intent to Act (Lifetime)  N   3. Active Suicidal Ideation with any Methods (Not Plan) Without Intent to Act (Past 1 Month)  N   4. Active Suicidal Ideation with Some Intent to Act, Without Specific Plan (Lifetime)  N   4. Active Suicidal Ideation with Some Intent to Act, Without Specific Plan (Past 1 Month)  N   5. Active Suicidal Ideation with Specific Plan and Intent (Lifetime)  N   5. Active Suicidal Ideation with Specific Plan and Intent (Past 1 Month)  N   Actual Attempt (Lifetime)  N   Has subject engaged in non-suicidal self-injurious behavior? (Lifetime)  N   Interrupted Attempts (Lifetime)  N   Aborted or  Self-Interrupted Attempt (Lifetime)  N   Preparatory Acts or Behavior (Lifetime)  N   Calculated C-SSRS Risk Score (Lifetime/Recent)  No Risk Indicated        Reason for Visit/Presenting Concern:  Anxiety    Current Stressors / Issues:       Pt has been back to work, team is great and easing her back into work.  The one day a week in-person requirement is really difficult to accept. Pt has a new role at work and this has been difficult.  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  Discussed acceptance of the fact that she will have to work 1 day in the office and how to make that day work for her and what schedule she needs to feel comfortable with this.  Pt may talk with boss for more clarity.  Pt reflected that her team may be more understanding of her needs than she is giving them credit for.     Therapeutic Interventions:  Motivational Interviewing (MI): Validated patient's thoughts, feelings and experience. Expressed respect for patient's autonomy in decision making.  Asked open-ended questions to invite patient's self-reflection and self-direction around change and what is important for them in working towards their goals.  Expressed and demonstrated empathy through reflective listening.  Explored discrepancy between patient's values and current state.  Cognitive Behavioral Therapy (CBT): Provided psycho-education on cognitive distortions., Identified and challenged maladaptive patterns of behavior and thinking that interfere with patients life, Identified behavioral changes that can be made to move towards treatment goals. , and Assisted patient in developing coping strategies (e.g. more physical exercise, less internal focus, increase social involvement, more assertiveness, etc.).    Response to treatment interventions:    Patient was receptive to interventions utilized.  Patient was engaged in the therapy process.       Progress on Treatment Objective(s) / Homework:   Satisfactory progress - ACTION (Actively working towards change); Intervened by reinforcing change plan / affirming steps taken     Medication Review:   No current psychiatric medications prescribed     Medication Compliance:   NA     Chemical Use Review:  Substance Use: Chemical use reviewed, no active concerns identified      Tobacco Use: No current tobacco use.       Assessment: Current Emotional / Mental Status (status of significant symptoms):      Risk status (Self / Other harm or suicidal ideation)   Patient denies a history of suicidal ideation, suicide attempts, self-injurious behavior, homicidal ideation, homicidal behavior, and and other safety concerns   Patient denies current fears or concerns for personal safety.   Patient denies current or recent suicidal ideation or behaviors.   Patient denies current or recent homicidal ideation or behaviors.   Patient denies current or recent self injurious behavior or ideation.   Patient denies other safety concerns.   Recommended that patient call 911 or go to the local ED should there be a change in any of these risk factors       ASSESSMENT:   Mental Status:     Appearance:                            Appropriate    Eye Contact:                           Good    Psychomotor Behavior:          Normal    Attitude:                                   Friendly Pleasant   Orientation:                             All   Speech Rate / Production:      Normal/ Responsive   Volume:                                   Normal    Mood:                                      Anxious  Sad    Affect:                                      Appropriate    Thought Content:                    Clear    Thought Form:                        Goal Directed    Insight:                                     Good          Diagnoses:   Adjustment  Disorders  309.24 (F43.22) With anxiety    Collateral Reports Completed:   Not Applicable       Plan: (Homework, other):   Patient was provided No indications of CD issues  Patient was given information about behavioral services and encouraged to schedule a follow up appointment with the clinic [facility] in 2 weeks.         KARLO Pathak     _____________________________________________________________________________________________________________________________________                                              Individual Treatment Plan    Patient's Name: America Pastor   YOB: 1990  Date of Creation: 5/7/25  Date Treatment Plan Last Reviewed/Revised: NA    DSM5 Diagnoses: Adjustment Disorders  309.24 (F43.22) With anxiety  Psychosocial / Contextual Factors: postpartum anxiety  PROMIS (reviewed every 90 days):   The following assessments were completed by patient for this visit:  PHQ9:       7/15/2013     4:15 PM 8/5/2024     3:14 PM 1/7/2025     3:43 PM 3/4/2025    10:50 AM 3/21/2025    10:38 AM   PHQ-9 SCORE   PHQ-9 Total Score 2       PHQ-9 Total Score MyChart   2 (Minimal depression) 9 (Mild depression) 3 (Minimal depression)   PHQ-9 Total Score  2 2  9  3        Patient-reported     GAD7:        No data to display              CAGE-AID:       3/4/2025    10:51 AM   CAGE-AID Total Score   Total Score 0    Total Score MyChart 0 (A total score of 2 or greater is considered clinically significant)       Patient-reported     PROMIS 10-Global Health (all questions and answers displayed):       3/13/2025     9:44 AM   PROMIS 10   In general, would you say your health is: Good   In general, would you say your quality of life is: Very good   In general, how would you rate your physical health? Good   In general, how would you rate your mental health, including your mood and your ability to think? Very good   In general, how would you rate your satisfaction with your social activities and  relationships? Very good   In general, please rate how well you carry out your usual social activities and roles Very good   To what extent are you able to carry out your everyday physical activities such as walking, climbing stairs, carrying groceries, or moving a chair? Completely   In the past 7 days, how often have you been bothered by emotional problems such as feeling anxious, depressed, or irritable? Rarely   In the past 7 days, how would you rate your fatigue on average? Moderate   In the past 7 days, how would you rate your pain on average, where 0 means no pain, and 10 means worst imaginable pain? 2   In general, would you say your health is: 3   In general, would you say your quality of life is: 4   In general, how would you rate your physical health? 3   In general, how would you rate your mental health, including your mood and your ability to think? 4   In general, how would you rate your satisfaction with your social activities and relationships? 4   In general, please rate how well you carry out your usual social activities and roles. (This includes activities at home, at work and in your community, and responsibilities as a parent, child, spouse, employee, friend, etc.) 4   To what extent are you able to carry out your everyday physical activities such as walking, climbing stairs, carrying groceries, or moving a chair? 5   In the past 7 days, how often have you been bothered by emotional problems such as feeling anxious, depressed, or irritable? 2   In the past 7 days, how would you rate your fatigue on average? 3   In the past 7 days, how would you rate your pain on average, where 0 means no pain, and 10 means worst imaginable pain? 2   Global Mental Health Score 16    Global Physical Health Score 15    PROMIS TOTAL - SUBSCORES 31        Patient-reported     [place] Suicide Severity Rating Scale (Lifetime/Recent)      2/4/2025     1:07 PM 3/4/2025    11:31 AM   [place] Suicide Severity Rating  (Lifetime/Recent)   Q1 Wished to be Dead (Past Month) 0-->no    Q2 Suicidal Thoughts (Past Month) 0-->no    Q6 Suicide Behavior (Lifetime) 0-->no    Level of Risk per Screen no risks indicated    1. Wish to be Dead (Lifetime)  N   1. Wish to be Dead (Past 1 Month)  N   2. Non-Specific Active Suicidal Thoughts (Lifetime)  N   2. Non-Specific Active Suicidal Thoughts (Past 1 Month)  N   3. Active Suicidal Ideation with any Methods (Not Plan) Without Intent to Act (Lifetime)  N   3. Active Suicidal Ideation with any Methods (Not Plan) Without Intent to Act (Past 1 Month)  N   4. Active Suicidal Ideation with Some Intent to Act, Without Specific Plan (Lifetime)  N   4. Active Suicidal Ideation with Some Intent to Act, Without Specific Plan (Past 1 Month)  N   5. Active Suicidal Ideation with Specific Plan and Intent (Lifetime)  N   5. Active Suicidal Ideation with Specific Plan and Intent (Past 1 Month)  N   Actual Attempt (Lifetime)  N   Has subject engaged in non-suicidal self-injurious behavior? (Lifetime)  N   Interrupted Attempts (Lifetime)  N   Aborted or Self-Interrupted Attempt (Lifetime)  N   Preparatory Acts or Behavior (Lifetime)  N   Calculated C-SSRS Risk Score (Lifetime/Recent)  No Risk Indicated        Referral / Collaboration:  Referral to another professional/service is not indicated at this time..    Anticipated number of session for this episode of care:  9-12 sessions  Anticipation frequency of session: Biweekly  Anticipated Duration of each session: 16-37 minutes  Treatment plan will be reviewed in 90 days or when goals have been changed.       MeasurableTreatment Goal(s) related to diagnosis / functional impairment(s)  Goal 1: Patient will improve management of anxiety symptoms as evidenced by reduced frequency, intensity and duration of anxiety symptoms. learn and implement coping skills that result in a reduction of anxiety and nervousness. improve daily functioning. recognize contributing  factors of anxiety and identify ways to intervene.    I will know I've met my goal when I can determine when I'm acting out of anxiety/fear and utilize coping skills.      Status: New - Date: 5/8/25     Intervention(s)  [facility] will Cognitive Behavioral Therapy (CBT): Provide psycho-education on cognitive distortions., Identify and challenge maladaptive patterns of behavior and thinking that interfere with patient's life, Identify behavioral changes that can be made to move towards treatment goals. , Assist patient in developing coping strategies (e.g. more physical exercise, less internal focus, increase social involvement, more assertiveness, etc.)., and Reinforce successes reported by patient since last appointment.           Patient has reviewed and agreed to the above plan.     Written by  KARLO Pathak

## 2025-06-10 ENCOUNTER — VIRTUAL VISIT (OUTPATIENT)
Dept: BEHAVIORAL HEALTH | Facility: CLINIC | Age: 35
End: 2025-06-10
Payer: COMMERCIAL

## 2025-06-10 DIAGNOSIS — F43.22 ADJUSTMENT DISORDER WITH ANXIETY: Primary | ICD-10-CM

## 2025-06-10 PROCEDURE — 90832 PSYTX W PT 30 MINUTES: CPT | Mod: 95 | Performed by: SOCIAL WORKER

## 2025-06-10 NOTE — PROGRESS NOTES
[facility]: Integrated Behavioral Health    Behavioral Health Clinician Progress Note   Mental Health & Addiction Services      Ro 10, 2025    Patient Name: America Pastor       Service Type:  Individual   Service Location:  MyChart / Email (patient reached)   Visit Start Time: 10:02 AM  Visit End Time:   10:30 AM   Session Length: 16 - 37    Attendees: Client   Service Modality: Video Visit:      Provider verified identity through the following two step process.  Patient provided:  Patient is known previously to provider    Telemedicine Visit: The patient's condition can be safely assessed and treated via synchronous audio and visual telemedicine encounter.      Reason for Telemedicine Visit: Patient has requested telehealth visit    Originating Site (Patient Location): Patient's home    [place] [facility] [place]    Consent:  The patient/guardian has verbally consented to: the potential risks and benefits of telemedicine (video visit) versus in person care; bill my insurance or make self-payment for services provided; and responsibility for payment of non-covered services.     Patient would like the video invitation sent by:  My Chart    Mode of Communication:  Video Conference via [facility]    Distant Location (Provider):  On-site    As the provider I attest to compliance with applicable laws and regulations related to telemedicine.   Visit number: 6    [facility] Visit Activities (Refresh list every visit): [facility] Only     Date of Brief Diagnostic Assessment : 3/13/25  Treatment Plan Review Date: NA      DATA:    Extended Session (60+ minutes): No   Interactive Complexity: No   Crisis: No   [facility] Patient: No     Assessments completed:  The following assessments were completed by patient for this visit:  PHQ9:       7/15/2013     4:15 PM 8/5/2024     3:14 PM 1/7/2025     3:43 PM 3/4/2025    10:50 AM 3/21/2025    10:38 AM   PHQ-9 SCORE   PHQ-9 Total  Score 2       PHQ-9 Total Score MyChart   2 (Minimal depression) 9 (Mild depression) 3 (Minimal depression)   PHQ-9 Total Score  2 2  9  3        Patient-reported     GAD7:        No data to display              CAGE-AID:       3/4/2025    10:51 AM   CAGE-AID Total Score   Total Score 0    Total Score MyChart 0 (A total score of 2 or greater is considered clinically significant)       Patient-reported     PROMIS 10-Global Health (all questions and answers displayed):       3/13/2025     9:44 AM   PROMIS 10   In general, would you say your health is: Good   In general, would you say your quality of life is: Very good   In general, how would you rate your physical health? Good   In general, how would you rate your mental health, including your mood and your ability to think? Very good   In general, how would you rate your satisfaction with your social activities and relationships? Very good   In general, please rate how well you carry out your usual social activities and roles Very good   To what extent are you able to carry out your everyday physical activities such as walking, climbing stairs, carrying groceries, or moving a chair? Completely   In the past 7 days, how often have you been bothered by emotional problems such as feeling anxious, depressed, or irritable? Rarely   In the past 7 days, how would you rate your fatigue on average? Moderate   In the past 7 days, how would you rate your pain on average, where 0 means no pain, and 10 means worst imaginable pain? 2   In general, would you say your health is: 3   In general, would you say your quality of life is: 4   In general, how would you rate your physical health? 3   In general, how would you rate your mental health, including your mood and your ability to think? 4   In general, how would you rate your satisfaction with your social activities and relationships? 4   In general, please rate how well you carry out your usual social activities and roles. (This  includes activities at home, at work and in your community, and responsibilities as a parent, child, spouse, employee, friend, etc.) 4   To what extent are you able to carry out your everyday physical activities such as walking, climbing stairs, carrying groceries, or moving a chair? 5   In the past 7 days, how often have you been bothered by emotional problems such as feeling anxious, depressed, or irritable? 2   In the past 7 days, how would you rate your fatigue on average? 3   In the past 7 days, how would you rate your pain on average, where 0 means no pain, and 10 means worst imaginable pain? 2   Global Mental Health Score 16    Global Physical Health Score 15    PROMIS TOTAL - SUBSCORES 31        Patient-reported     [place] Suicide Severity Rating Scale (Lifetime/Recent)      2/4/2025     1:07 PM 3/4/2025    11:31 AM   [place] Suicide Severity Rating (Lifetime/Recent)   Q1 Wished to be Dead (Past Month) 0-->no    Q2 Suicidal Thoughts (Past Month) 0-->no    Q6 Suicide Behavior (Lifetime) 0-->no    Level of Risk per Screen no risks indicated    1. Wish to be Dead (Lifetime)  N   1. Wish to be Dead (Past 1 Month)  N   2. Non-Specific Active Suicidal Thoughts (Lifetime)  N   2. Non-Specific Active Suicidal Thoughts (Past 1 Month)  N   3. Active Suicidal Ideation with any Methods (Not Plan) Without Intent to Act (Lifetime)  N   3. Active Suicidal Ideation with any Methods (Not Plan) Without Intent to Act (Past 1 Month)  N   4. Active Suicidal Ideation with Some Intent to Act, Without Specific Plan (Lifetime)  N   4. Active Suicidal Ideation with Some Intent to Act, Without Specific Plan (Past 1 Month)  N   5. Active Suicidal Ideation with Specific Plan and Intent (Lifetime)  N   5. Active Suicidal Ideation with Specific Plan and Intent (Past 1 Month)  N   Actual Attempt (Lifetime)  N   Has subject engaged in non-suicidal self-injurious behavior? (Lifetime)  N   Interrupted Attempts (Lifetime)  N   Aborted or  "Self-Interrupted Attempt (Lifetime)  N   Preparatory Acts or Behavior (Lifetime)  N   Calculated C-SSRS Risk Score (Lifetime/Recent)  No Risk Indicated        Reason for Visit/Presenting Concern:  Anxiety    Current Stressors / Issues:     Pt is meeting with HR, asking for another month to process the information and routine before going back to work.  Pt feeling like she can accept/live with either outcome. \"It's a lot of new things at one time.  Felt a weight off shoulders knowing expectations of work and logistics after talking with her supervisor last week.  Discussed how much adjusting she has needed to do in the past 4 months and how difficult that has been.      Therapeutic Interventions:  Motivational Interviewing (MI): Validated patient's thoughts, feelings and experience. Expressed respect for patient's autonomy in decision making.  Asked open-ended questions to invite patient's self-reflection and self-direction around change and what is important for them in working towards their goals.  Expressed and demonstrated empathy through reflective listening.  Explored discrepancy between patient's values and current state.  Cognitive Behavioral Therapy (CBT): Provided psycho-education on cognitive distortions., Identified and challenged maladaptive patterns of behavior and thinking that interfere with patients life, Identified behavioral changes that can be made to move towards treatment goals. , and Assisted patient in developing coping strategies (e.g. more physical exercise, less internal focus, increase social involvement, more assertiveness, etc.).    Response to treatment interventions:   Patient was receptive to interventions utilized.  Patient was engaged in the therapy process.       Progress on Treatment Objective(s) / Homework:   Satisfactory progress - ACTION (Actively working towards change); Intervened by reinforcing change plan / affirming steps taken     Medication Review:   No current " psychiatric medications prescribed     Medication Compliance:   NA     Chemical Use Review:  Substance Use: Chemical use reviewed, no active concerns identified      Tobacco Use: No current tobacco use.       Assessment: Current Emotional / Mental Status (status of significant symptoms):      Risk status (Self / Other harm or suicidal ideation)   Patient denies a history of suicidal ideation, suicide attempts, self-injurious behavior, homicidal ideation, homicidal behavior, and and other safety concerns   Patient denies current fears or concerns for personal safety.   Patient denies current or recent suicidal ideation or behaviors.   Patient denies current or recent homicidal ideation or behaviors.   Patient denies current or recent self injurious behavior or ideation.   Patient denies other safety concerns.   Recommended that patient call 911 or go to the local ED should there be a change in any of these risk factors       ASSESSMENT:   Mental Status:     Appearance:                            Appropriate    Eye Contact:                           Good    Psychomotor Behavior:          Normal    Attitude:                                   Friendly Pleasant   Orientation:                             All   Speech Rate / Production:      Normal/ Responsive   Volume:                                   Normal    Mood:                                      Anxious  Sad    Affect:                                      Appropriate    Thought Content:                    Clear    Thought Form:                        Goal Directed    Insight:                                     Good          Diagnoses:   Adjustment Disorders  309.24 (F43.22) With anxiety    Collateral Reports Completed:   Not Applicable       Plan: (Homework, other):   Patient was provided No indications of CD issues  Patient was given information about behavioral services and encouraged to schedule a follow up appointment with the clinic [facility] in 2 weeks.          Linda Conrad, [facility]     _____________________________________________________________________________________________________________________________________                                              Individual Treatment Plan    Patient's Name: mAerica Pastor   YOB: 1990  Date of Creation: 5/7/25  Date Treatment Plan Last Reviewed/Revised: NA    DSM5 Diagnoses: Adjustment Disorders  309.24 (F43.22) With anxiety  Psychosocial / Contextual Factors: postpartum anxiety  PROMIS (reviewed every 90 days):   The following assessments were completed by patient for this visit:  PHQ9:       7/15/2013     4:15 PM 8/5/2024     3:14 PM 1/7/2025     3:43 PM 3/4/2025    10:50 AM 3/21/2025    10:38 AM   PHQ-9 SCORE   PHQ-9 Total Score 2       PHQ-9 Total Score MyChart   2 (Minimal depression) 9 (Mild depression) 3 (Minimal depression)   PHQ-9 Total Score  2 2  9  3        Patient-reported         CAGE-AID:       3/4/2025    10:51 AM   CAGE-AID Total Score   Total Score 0    Total Score MyChart 0 (A total score of 2 or greater is considered clinically significant)       Patient-reported     PROMIS 10-Global Health (all questions and answers displayed):       3/13/2025     9:44 AM   PROMIS 10   In general, would you say your health is: Good   In general, would you say your quality of life is: Very good   In general, how would you rate your physical health? Good   In general, how would you rate your mental health, including your mood and your ability to think? Very good   In general, how would you rate your satisfaction with your social activities and relationships? Very good   In general, please rate how well you carry out your usual social activities and roles Very good   To what extent are you able to carry out your everyday physical activities such as walking, climbing stairs, carrying groceries, or moving a chair? Completely   In the past 7 days, how often have you been bothered by emotional problems such as  feeling anxious, depressed, or irritable? Rarely   In the past 7 days, how would you rate your fatigue on average? Moderate   In the past 7 days, how would you rate your pain on average, where 0 means no pain, and 10 means worst imaginable pain? 2   In general, would you say your health is: 3   In general, would you say your quality of life is: 4   In general, how would you rate your physical health? 3   In general, how would you rate your mental health, including your mood and your ability to think? 4   In general, how would you rate your satisfaction with your social activities and relationships? 4   In general, please rate how well you carry out your usual social activities and roles. (This includes activities at home, at work and in your community, and responsibilities as a parent, child, spouse, employee, friend, etc.) 4   To what extent are you able to carry out your everyday physical activities such as walking, climbing stairs, carrying groceries, or moving a chair? 5   In the past 7 days, how often have you been bothered by emotional problems such as feeling anxious, depressed, or irritable? 2   In the past 7 days, how would you rate your fatigue on average? 3   In the past 7 days, how would you rate your pain on average, where 0 means no pain, and 10 means worst imaginable pain? 2   Global Mental Health Score 16    Global Physical Health Score 15    PROMIS TOTAL - SUBSCORES 31        Patient-reported     [place] Suicide Severity Rating Scale (Lifetime/Recent)      2/4/2025     1:07 PM 3/4/2025    11:31 AM   [place] Suicide Severity Rating (Lifetime/Recent)   Q1 Wished to be Dead (Past Month) 0-->no    Q2 Suicidal Thoughts (Past Month) 0-->no    Q6 Suicide Behavior (Lifetime) 0-->no    Level of Risk per Screen no risks indicated    1. Wish to be Dead (Lifetime)  N   1. Wish to be Dead (Past 1 Month)  N   2. Non-Specific Active Suicidal Thoughts (Lifetime)  N   2. Non-Specific Active Suicidal Thoughts (Past  1 Month)  N   3. Active Suicidal Ideation with any Methods (Not Plan) Without Intent to Act (Lifetime)  N   3. Active Suicidal Ideation with any Methods (Not Plan) Without Intent to Act (Past 1 Month)  N   4. Active Suicidal Ideation with Some Intent to Act, Without Specific Plan (Lifetime)  N   4. Active Suicidal Ideation with Some Intent to Act, Without Specific Plan (Past 1 Month)  N   5. Active Suicidal Ideation with Specific Plan and Intent (Lifetime)  N   5. Active Suicidal Ideation with Specific Plan and Intent (Past 1 Month)  N   Actual Attempt (Lifetime)  N   Has subject engaged in non-suicidal self-injurious behavior? (Lifetime)  N   Interrupted Attempts (Lifetime)  N   Aborted or Self-Interrupted Attempt (Lifetime)  N   Preparatory Acts or Behavior (Lifetime)  N   Calculated C-SSRS Risk Score (Lifetime/Recent)  No Risk Indicated        Referral / Collaboration:  Referral to another professional/service is not indicated at this time..    Anticipated number of session for this episode of care:  9-12 sessions  Anticipation frequency of session: Biweekly  Anticipated Duration of each session: 16-37 minutes  Treatment plan will be reviewed in 90 days or when goals have been changed.       MeasurableTreatment Goal(s) related to diagnosis / functional impairment(s)  Goal 1: Patient will improve management of anxiety symptoms as evidenced by reduced frequency, intensity and duration of anxiety symptoms. learn and implement coping skills that result in a reduction of anxiety and nervousness. improve daily functioning. recognize contributing factors of anxiety and identify ways to intervene.    I will know I've met my goal when I can determine when I'm acting out of anxiety/fear and utilize coping skills.      Status: New - Date: 5/8/25     Intervention(s)  [facility] will Cognitive Behavioral Therapy (CBT): Provide psycho-education on cognitive distortions., Identify and challenge maladaptive patterns of behavior and  thinking that interfere with patient's life, Identify behavioral changes that can be made to move towards treatment goals. , Assist patient in developing coping strategies (e.g. more physical exercise, less internal focus, increase social involvement, more assertiveness, etc.)., and Reinforce successes reported by patient since last appointment.           Patient has reviewed and agreed to the above plan.     Written by  KARLO Pathak

## 2025-08-05 ENCOUNTER — MEDICAL CORRESPONDENCE (OUTPATIENT)
Dept: HEALTH INFORMATION MANAGEMENT | Facility: CLINIC | Age: 35
End: 2025-08-05
Payer: COMMERCIAL